# Patient Record
Sex: FEMALE | Race: WHITE | NOT HISPANIC OR LATINO | Employment: OTHER | URBAN - METROPOLITAN AREA
[De-identification: names, ages, dates, MRNs, and addresses within clinical notes are randomized per-mention and may not be internally consistent; named-entity substitution may affect disease eponyms.]

---

## 2017-02-24 DIAGNOSIS — O26.90 PREGNANCY RELATED CONDITION, UNSPECIFIED TRIMESTER: Primary | ICD-10-CM

## 2017-03-01 ENCOUNTER — TRANSFERRED RECORDS (OUTPATIENT)
Dept: HEALTH INFORMATION MANAGEMENT | Facility: CLINIC | Age: 47
End: 2017-03-01

## 2017-03-01 LAB — PAP SMEAR - HIM PATIENT REPORTED: NEGATIVE

## 2017-03-20 ENCOUNTER — ANESTHESIA (OUTPATIENT)
Dept: OBGYN | Facility: CLINIC | Age: 47
End: 2017-03-20
Payer: COMMERCIAL

## 2017-03-20 ENCOUNTER — ANESTHESIA EVENT (OUTPATIENT)
Dept: OBGYN | Facility: CLINIC | Age: 47
End: 2017-03-20
Payer: COMMERCIAL

## 2017-03-20 PROBLEM — Z98.891 S/P CESAREAN SECTION: Status: ACTIVE | Noted: 2017-03-20

## 2017-03-20 PROCEDURE — 25800025 ZZH RX 258: Performed by: OBSTETRICS & GYNECOLOGY

## 2017-03-20 PROCEDURE — 25000128 H RX IP 250 OP 636: Performed by: NURSE ANESTHETIST, CERTIFIED REGISTERED

## 2017-03-20 PROCEDURE — 25000128 H RX IP 250 OP 636: Performed by: ANESTHESIOLOGY

## 2017-03-20 PROCEDURE — 25000128 H RX IP 250 OP 636: Performed by: OBSTETRICS & GYNECOLOGY

## 2017-03-20 PROCEDURE — 25000125 ZZHC RX 250: Performed by: NURSE ANESTHETIST, CERTIFIED REGISTERED

## 2017-03-20 RX ORDER — MORPHINE SULFATE 1 MG/ML
INJECTION, SOLUTION EPIDURAL; INTRATHECAL; INTRAVENOUS PRN
Status: DISCONTINUED | OUTPATIENT
Start: 2017-03-20 | End: 2017-03-20

## 2017-03-20 RX ORDER — FENTANYL CITRATE 50 UG/ML
INJECTION, SOLUTION INTRAMUSCULAR; INTRAVENOUS PRN
Status: DISCONTINUED | OUTPATIENT
Start: 2017-03-20 | End: 2017-03-20

## 2017-03-20 RX ORDER — BUPIVACAINE HYDROCHLORIDE 7.5 MG/ML
INJECTION, SOLUTION INTRASPINAL PRN
Status: DISCONTINUED | OUTPATIENT
Start: 2017-03-20 | End: 2017-03-20

## 2017-03-20 RX ORDER — EPHEDRINE SULFATE 50 MG/ML
INJECTION, SOLUTION INTRAMUSCULAR; INTRAVENOUS; SUBCUTANEOUS PRN
Status: DISCONTINUED | OUTPATIENT
Start: 2017-03-20 | End: 2017-03-20

## 2017-03-20 RX ADMIN — SODIUM CHLORIDE, POTASSIUM CHLORIDE, SODIUM LACTATE AND CALCIUM CHLORIDE: 600; 310; 30; 20 INJECTION, SOLUTION INTRAVENOUS at 07:39

## 2017-03-20 RX ADMIN — CEFAZOLIN SODIUM 2 G: 2 INJECTION, SOLUTION INTRAVENOUS at 07:16

## 2017-03-20 RX ADMIN — BUPIVACAINE HYDROCHLORIDE IN DEXTROSE 11.25 MG: 7.5 INJECTION, SOLUTION SUBARACHNOID at 07:07

## 2017-03-20 RX ADMIN — ONDANSETRON 4 MG: 2 INJECTION INTRAMUSCULAR; INTRAVENOUS at 07:04

## 2017-03-20 RX ADMIN — FENTANYL CITRATE 10 MCG: 50 INJECTION, SOLUTION INTRAMUSCULAR; INTRAVENOUS at 07:07

## 2017-03-20 RX ADMIN — PHENYLEPHRINE HYDROCHLORIDE 100 MCG: 10 INJECTION, SOLUTION INTRAMUSCULAR; INTRAVENOUS; SUBCUTANEOUS at 07:57

## 2017-03-20 RX ADMIN — PHENYLEPHRINE HYDROCHLORIDE 100 MCG: 10 INJECTION, SOLUTION INTRAMUSCULAR; INTRAVENOUS; SUBCUTANEOUS at 07:14

## 2017-03-20 RX ADMIN — PHENYLEPHRINE HYDROCHLORIDE 100 MCG: 10 INJECTION, SOLUTION INTRAMUSCULAR; INTRAVENOUS; SUBCUTANEOUS at 07:42

## 2017-03-20 RX ADMIN — Medication 5 MG: at 07:30

## 2017-03-20 RX ADMIN — MORPHINE SULFATE 0.1 MG: 1 INJECTION EPIDURAL; INTRATHECAL; INTRAVENOUS at 07:07

## 2017-03-20 RX ADMIN — Medication 5 MG: at 07:46

## 2017-03-20 RX ADMIN — Medication 5 MG: at 07:14

## 2017-03-20 RX ADMIN — Medication: at 07:35

## 2017-03-20 RX ADMIN — PHENYLEPHRINE HYDROCHLORIDE 100 MCG: 10 INJECTION, SOLUTION INTRAMUSCULAR; INTRAVENOUS; SUBCUTANEOUS at 07:10

## 2017-03-20 RX ADMIN — PHENYLEPHRINE HYDROCHLORIDE 100 MCG: 10 INJECTION, SOLUTION INTRAMUSCULAR; INTRAVENOUS; SUBCUTANEOUS at 07:30

## 2017-03-20 RX ADMIN — Medication 5 MG: at 07:10

## 2017-03-20 RX ADMIN — PHENYLEPHRINE HYDROCHLORIDE 100 MCG: 10 INJECTION, SOLUTION INTRAMUSCULAR; INTRAVENOUS; SUBCUTANEOUS at 07:46

## 2017-03-20 ASSESSMENT — LIFESTYLE VARIABLES: TOBACCO_USE: 0

## 2017-03-20 NOTE — ADDENDUM NOTE
Addendum  created 03/20/17 3366 by Arpit Palmer MD    Anesthesia Review and Sign - Ready for Procedure, Anesthesia Review and Sign - Signed

## 2017-03-20 NOTE — ANESTHESIA POSTPROCEDURE EVALUATION
Patient: Ethel De Luna    Procedure(s):  REPEAT  SECTION - Wound Class: I-Clean    Diagnosis:PREVIOUS AND FIBROIDS  Diagnosis Additional Information: No value filed.    Anesthesia Type:  Spinal    Note:  Anesthesia Post Evaluation    Patient location during evaluation: PACU  Patient participation: Able to fully participate in evaluation  Level of consciousness: awake and alert  Pain management: adequate  Airway patency: patent  Cardiovascular status: acceptable  Respiratory status: acceptable  Hydration status: acceptable  PONV: none     Anesthetic complications: None          Last vitals:  Vitals:    17 1046 17 1132 17 1228   BP: 126/67 123/72 126/57   Resp: 16 16 16   Temp:      SpO2: 100% 98% 99%         Electronically Signed By: Arpit Palmer MD  2017  1:43 PM

## 2017-03-20 NOTE — ANESTHESIA PREPROCEDURE EVALUATION
Procedure: Procedure(s):   SECTION  Preop diagnosis: PREVIOUS AND FIBROIDS    No Known Allergies  Past Medical History   Diagnosis Date     Fibroid of cervix      Infertility      IVF     Past Surgical History   Procedure Laterality Date     Orthopedic surgery       right hip surgery for hip dysplasia when she was 5yrs old      section N/A 2015     Procedure:  SECTION;  Surgeon: Merry Ying MD;  Location:  L+D     Prior to Admission medications    Medication Sig Start Date End Date Taking? Authorizing Provider   Prenatal Vit w/Ok-Cipdenwyd-QI (PNV PO)    Yes Reported, Patient   Ranitidine HCl (ZANTAC 150 MAXIMUM STRENGTH PO) Take by mouth 2 times daily   Yes Reported, Patient   ferrous sulfate (SLO-FE) 142 (45 FE) MG TBCR Take 142 mg by mouth daily   Yes Reported, Patient   Acetaminophen (TYLENOL PO) Take 650 mg by mouth as needed for mild pain or fever   Yes Reported, Patient   NO ACTIVE MEDICATIONS     Reported, Patient     Current Facility-Administered Medications Ordered in Epic   Medication Dose Route Frequency Last Rate Last Dose     lactated ringers infusion   Intravenous Continuous 125 mL/hr at 17 0556       sodium citrate-citric acid (BICITRA) solution 30 mL  30 mL Oral Pre-Op/Pre-procedure x 1 dose         ceFAZolin sodium-dextrose (ANCEF) infusion 2 g  2 g Intravenous Pre-Op/Pre-procedure x 1 dose         ceFAZolin (ANCEF) 1 g vial to attach to  ml bag for ADULT or 50 ml bag for PEDS  1 g Intravenous Q2H PRN         No current [facility]-ordered outpatient prescriptions on file.     Wt Readings from Last 1 Encounters:   17 80.7 kg (178 lb)     Temp Readings from Last 1 Encounters:   16 37  C (98.6  F) (Oral)     BP Readings from Last 6 Encounters:   16 117/73   16 114/56   07/05/15 130/81   11 110/70     Pulse Readings from Last 4 Encounters:   16 83   16 59   07/04/15 70   11 80     Resp Readings from Last  1 Encounters:   05/16/16 20     SpO2 Readings from Last 1 Encounters:   07/03/15 98%     Recent Labs   Lab Test  05/02/16   1050   CR  0.60     Recent Labs   Lab Test  03/20/17   0535  05/16/16   1505  05/02/16   1050   WBC   --   7.8  6.2   HGB  10.6*  11.9  11.2*   PLT   --   303  263     No results for input(s): INR in the last 82135 hours.    Invalid input(s): APTT   RECENT LABS:   ECG:   ECHO:   CXR:    Anesthesia Evaluation     .        ROS/MED HX    ENT/Pulmonary:      (-) tobacco use and sleep apnea   Neurologic:       Cardiovascular:         METS/Exercise Tolerance:     Hematologic:         Musculoskeletal:         GI/Hepatic:     (+) GERD       Renal/Genitourinary: Comment: Uterine fibroids        Endo:         Psychiatric:         Infectious Disease:         Malignancy:         Other:               Physical Exam  Normal systems: cardiovascular, pulmonary and dental    Airway   Mallampati: III  TM distance: >3 FB  Neck ROM: full    Dental     Cardiovascular       Pulmonary                     Anesthesia Plan      History & Physical Review  History and physical reviewed and following examination; no interval change.    ASA Status:  2 .        Plan for Spinal   PONV prophylaxis:  Ondansetron (or other 5HT-3)       Postoperative Care  Postoperative pain management:  Neuraxial analgesia.      Consents  Anesthetic plan, risks, benefits and alternatives discussed with:  Patient and Spouse..                          .

## 2017-03-20 NOTE — ANESTHESIA CARE TRANSFER NOTE
Patient: Ethel De Luna    Procedure(s):  REPEAT  SECTION - Wound Class: I-Clean    Diagnosis: PREVIOUS AND FIBROIDS  Diagnosis Additional Information: No value filed.    Anesthesia Type:   Spinal     Note:  Airway :Room Air  Patient transferred to:PACU        Vitals: (Last set prior to Anesthesia Care Transfer)    CRNA VITALS  3/20/2017 0750 - 3/20/2017 0825      3/20/2017             Resp Rate (set): 10                Electronically Signed By: AXEL Spangler CRNA  2017  8:25 AM

## 2017-04-14 ENCOUNTER — TELEPHONE (OUTPATIENT)
Dept: NURSING | Facility: CLINIC | Age: 47
End: 2017-04-14

## 2017-04-14 NOTE — TELEPHONE ENCOUNTER
Call Type: Triage Call    Presenting Problem: Patient calling with questions about cold relief  medications that are safe to take while breast feeding.  Triage Note:  Guideline Title: Information Only Call; No Symptom Triage (Adult)  Recommended Disposition: Call Provider When Office is Open  Original Inclination: Wanted to speak with a nurse  Override Disposition:  Intended Action: Call PCP/HCP  Physician Contacted: No  Requesting information and provider is best resource; no triage required. ?  YES  Requesting regular office appointment ? NO  Sign(s) or symptom(s) associated with a diagnosed condition or with a new illness  ? NO  Requesting information about provider, services or community resources ? NO  Call back to complete assessment/clarification of information from prior caller to  complete triage ? NO  Physician Instructions:  Care Advice:

## 2017-05-14 ENCOUNTER — HOSPITAL ENCOUNTER (EMERGENCY)
Facility: CLINIC | Age: 47
Discharge: HOME | End: 2017-05-14
Attending: PHYSICIAN ASSISTANT | Admitting: PHYSICIAN ASSISTANT
Payer: COMMERCIAL

## 2017-05-14 VITALS
RESPIRATION RATE: 18 BRPM | DIASTOLIC BLOOD PRESSURE: 78 MMHG | OXYGEN SATURATION: 99 % | WEIGHT: 144 LBS | BODY MASS INDEX: 24.59 KG/M2 | HEIGHT: 64 IN | TEMPERATURE: 98.3 F | SYSTOLIC BLOOD PRESSURE: 141 MMHG

## 2017-05-14 DIAGNOSIS — J06.9 UPPER RESPIRATORY TRACT INFECTION, UNSPECIFIED TYPE: ICD-10-CM

## 2017-05-14 PROCEDURE — 99282 EMERGENCY DEPT VISIT SF MDM: CPT

## 2017-05-14 RX ORDER — AZITHROMYCIN 250 MG/1
TABLET, FILM COATED ORAL
Qty: 6 TABLET | Refills: 0 | Status: SHIPPED | OUTPATIENT
Start: 2017-05-14 | End: 2018-01-25

## 2017-05-14 ASSESSMENT — ENCOUNTER SYMPTOMS
SORE THROAT: 1
NAUSEA: 0
VOMITING: 0
SHORTNESS OF BREATH: 0
COUGH: 1
FEVER: 0

## 2017-05-14 NOTE — ED AVS SNAPSHOT
Emergency Department    [address] [facility] [address]    Phone:  [phone]    Fax:  [phone]                                       Ethel De Luna   MRN: [id number]    Department:   Emergency Department   Date of Visit:  5/14/2017           Patient Information     Date Of Birth          1970        Your diagnoses for this visit were:     Upper respiratory tract infection, unspecified type        You were seen by Jeanna Malik PA-C.      Follow-up Information     Follow up with Merry Ying MD. Schedule an appointment as soon as possible for a visit in 5 days.    Specialty:  Obstetrics    Why:  for a recheck     Contact information:    [facility]  [address] [place] [address] [address]  [phone]          Discharge Instructions       Discharge Instructions  Upper Respiratory Infection    The upper respiratory tract includes the sinuses, nasal passages, pharynx, and larynx. A URI, or upper respiratory infection, is an infection of any of the parts of the upper airway. Symptoms include runny nose, congestion, sore throat, cough, and fever. URIs are almost always caused by a virus. Antibiotics do not help with virus infections, so are not used for an ordinary URI. A URI is very contagious through coughing and nasal secretions; make sure you wash your hands often and clean surfaces after sneezing, coughing or touching them.  Viruses can live on surfaces for up to 3 days.      Return to the Emergency Department if:    Any of the symptoms you have get much worse.    You seem very sick, like being too weak to get up.    You have any new symptoms, especially serious things like chest pain.     You are short of breath.     You have a severe headache.    You are vomiting so much you can t keep fluids or medicines down.    You have confusion or seem unusually drowsy.    You have a seizure or convulsion.    Follow-up:      You should start to improve  in 3 - 5 days.  A cough can linger for up to six weeks, but overall you should be feeling much better.  See your doctor if you have a fever for more than 3 days, or if you are not feeling better within 5 days.      What can I do to help myself?    Fill any prescriptions the doctor gave you and take them right away    If you have a fever, get plenty of rest and drink lots of fluids, especially water. Using a humidifier or saline nose spray will also help loosen secretions.     What clothes or blankets you have on won t change your fever. Do what is comfortable for you.    Bathing or sponging in lukewarm water may help you feel better.    Tylenol  (acetaminophen), Motrin  (ibuprofen), or Advil  (ibuprofen) help bring fever down and may help you feel more comfortable. Be sure to read and follow the package directions, and ask your doctor if you have questions.    Do not drink alcohol.    Decongestants may help you feel better. You may use decongestant nose sprays Afrin  (oxymetazoline) or Carl-Synephrine  (phenylephrine hydrochloride) for up to 3 days, or may use a decongestant tablet like Sudafed  (pseudoephedrine).  If you were given a prescription for medicine here today, be sure to read all of the information (including the package insert) that comes with your prescription.  This will include important information about the medicine, its side effects, and any warnings that you need to know about.  The pharmacist who fills the prescription can provide more information and answer questions you may have about the medicine.  If you have questions or concerns that the pharmacist cannot address, please call or return to the Emergency Department.   Opioid Medication Information    Pain medications are among the most commonly prescribed medicines, so we are including this information for all our patients. If you did not receive pain medication or get a prescription for pain medicine, you can ignore it.     You may have been  given a prescription for an opioid (narcotic) pain medicine and/or have received a pain medicine while here in the Emergency Department. These medicines can make you drowsy or impaired. You must not drive, operate dangerous equipment, or engage in any other dangerous activities while taking these medications. If you drive while taking these medications, you could be arrested for DUI, or driving under the influence. Do not drink any alcohol while you are taking these medications.     Opioid pain medications can cause addiction. If you have a history of chemical dependency of any type, you are at a higher risk of becoming addicted to pain medications.  Only take these prescribed medications to treat your pain when all other options have been tried. Take it for as short a time and as few doses as possible. Store your pain pills in a secure place, as they are frequently stolen and provide a dangerous opportunity for children or visitors in your house to start abusing these powerful medications. We will not replace any lost or stolen medicine.  As soon as your pain is better, you should flush all your remaining medication.     Many prescription pain medications contain Tylenol  (acetaminophen), including Vicodin , Tylenol #3 , Norco , Lortab , and Percocet .  You should not take any extra pills of Tylenol  if you are using these prescription medications or you can get very sick.  Do not ever take more than 3000 mg of acetaminophen in any 24 hour period.    All opioids tend to cause constipation. Drink plenty of water and eat foods that have a lot of fiber, such as fruits, vegetables, prune juice, apple juice and high fiber cereal.  Take a laxative if you don t move your bowels at least every other day. Miralax , Milk of Magnesia, Colace , or Senna  can be used to keep you regular.      Remember that you can always come back to the Emergency Department if you are not able to see your regular doctor in the amount of time  listed above, if you get any new symptoms, or if there is anything that worries you.        24 Hour Appointment Hotline       To make an appointment at any [facility], call [phone] ([phone]). If you don't have a family doctor or clinic, we will help you find one. [facility] clinics are conveniently located to serve the needs of you and your family.             Review of your medicines      START taking        Dose / Directions Last dose taken    azithromycin 250 MG tablet   Commonly known as:  ZITHROMAX Z-APOLONIA   Quantity:  6 tablet        Two tablets on the first day, then one tablet daily for the next 4 days   Refills:  0          Our records show that you are taking the medicines listed below. If these are incorrect, please call your family doctor or clinic.        Dose / Directions Last dose taken    docusate sodium 100 MG capsule   Commonly known as:  COLACE   Dose:  100 mg   Quantity:  60 capsule        Take 1 capsule (100 mg) by mouth 2 times daily   Refills:  1        ferrous sulfate 142 (45 FE) MG Tbcr   Commonly known as:  SLO-FE   Dose:  142 mg        Take 142 mg by mouth daily   Refills:  0        ibuprofen 400 MG tablet   Commonly known as:  ADVIL/MOTRIN   Dose:  400-800 mg   Quantity:  90 tablet        Take 1-2 tablets (400-800 mg) by mouth every 6 hours as needed for other (cramping)   Refills:  1        oxyCODONE 5 MG IR tablet   Commonly known as:  ROXICODONE   Dose:  5-10 mg   Quantity:  45 tablet        Take 1-2 tablets (5-10 mg) by mouth every 3 hours as needed for moderate to severe pain   Refills:  0        PNV PO        Refills:  0        TYLENOL PO   Dose:  650 mg        Take 650 mg by mouth as needed for mild pain or fever   Refills:  0                Prescriptions were sent or printed at these locations (1 Prescription)                   Other Prescriptions                Printed at Department/Unit printer (1 of 1)         azithromycin (ZITHROMAX Z-APOLONIA) 250 MG tablet                 Orders Needing Specimen Collection     None      Pending Results     No orders found from 5/12/2017 to 5/15/2017.            Pending Culture Results     No orders found from 5/12/2017 to 5/15/2017.            Pending Results Instructions     If you had any lab results that were not finalized at the time of your Discharge, you can call the ED Lab Result RN at [phone]. You will be contacted by this team for any positive Lab results or changes in treatment. The nurses are available 7 days a week from 10A to 6:30P.  You can leave a message 24 hours per day and they will return your call.        Test Results From Your Hospital Stay               Clinical Quality Measure: Blood Pressure Screening     Your blood pressure was checked while you were in the emergency department today. The last reading we obtained was  BP: 141/78 . Please read the guidelines below about what these numbers mean and what you should do about them.  If your systolic blood pressure (the top number) is less than 120 and your diastolic blood pressure (the bottom number) is less than 80, then your blood pressure is normal. There is nothing more that you need to do about it.  If your systolic blood pressure (the top number) is 120-139 or your diastolic blood pressure (the bottom number) is 80-89, your blood pressure may be higher than it should be. You should have your blood pressure rechecked within a year by a primary care provider.  If your systolic blood pressure (the top number) is 140 or greater or your diastolic blood pressure (the bottom number) is 90 or greater, you may have high blood pressure. High blood pressure is treatable, but if left untreated over time it can put you at risk for heart attack, stroke, or kidney failure. You should have your blood pressure rechecked by a primary care provider within the next 4 weeks.  If your provider in the emergency department today gave you specific instructions to follow-up with your  "doctor or provider even sooner than that, you should follow that instruction and not wait for up to 4 weeks for your follow-up visit.        Thank you for choosing [facility]       Thank you for choosing [facility] for your care. Our goal is always to provide you with excellent care. Hearing back from our patients is one way we can continue to improve our services. Please take a few minutes to complete the written survey that you may receive in the mail after you visit with us. Thank you!        [email or web address] Information     [facility] lets you send messages to your doctor, view your test results, renew your prescriptions, schedule appointments and more. To sign up, go to [email or web address] . Click on \"Log in\" on the left side of the screen, which will take you to the Welcome page. Then click on \"Sign up Now\" on the right side of the page.     You will be asked to enter the access code listed below, as well as some personal information. Please follow the directions to create your username and password.     Your access code is: [email or web address]  Expires: 2017 11:35 AM     Your access code will  in 90 days. If you need help or a new code, please call your [facility] clinic or [phone].        Care EveryWhere ID     This is your Care EveryWhere ID. This could be used by other organizations to access your [facility] medical records  [id number]        After Visit Summary       This is your record. Keep this with you and show to your community pharmacist(s) and doctor(s) at your next visit.                  "

## 2017-05-14 NOTE — ED AVS SNAPSHOT
Emergency Department    [address] [place] [address]    Phone:  [phone]    Fax:  [phone]                                       Ethel De Luna   MRN: [id number]    Department:   Emergency Department   Date of Visit:  5/14/2017           After Visit Summary Signature Page     I have received my discharge instructions, and my questions have been answered. I have discussed any challenges I see with this plan with the nurse or doctor.    ..........................................................................................................................................  Patient/Patient Representative Signature      ..........................................................................................................................................  Patient Representative Print Name and Relationship to Patient    ..................................................               ................................................  Date                                            Time    ..........................................................................................................................................  Reviewed by Signature/Title    ...................................................              ..............................................  Date                                                            Time

## 2017-05-15 ENCOUNTER — CARE COORDINATION (OUTPATIENT)
Dept: CASE MANAGEMENT | Facility: CLINIC | Age: 47
End: 2017-05-15

## 2017-05-15 NOTE — ED PROVIDER NOTES
"  History     Chief Complaint:  Cold symptoms    HPI   Ethel De Luna is a 47 year old female who presents to the ED for evaluation of cold symptoms. The patient reports four day history of cough, congestion, sore throat, which has progressively worsened. She describes feeling a \"heaviness\", but states she is not short of breath. Her cough is productive but non bloody.  She has not measured a fever at home. She denies nausea and vomiting. She has no weird rashes or measles exposure.    Allergies:  No known drug allergies    Medications:    The patient is not currently taking any prescribed medications.    Past Medical History:    Fibroid of cervix  Infertility  Anemia    Past Surgical History:    C section  Orthopedic surgery    Family History:    No past pertinent family history.    Social History:  The patient was in the ED alone.  Smoking Status: Former smoker- 0.5 packs/day for 20 yrs  Alcohol Use: No  Marital Status:        Review of Systems   Constitutional: Negative for fever.   HENT: Positive for congestion and sore throat.    Respiratory: Positive for cough. Negative for shortness of breath.    Gastrointestinal: Negative for nausea and vomiting.   All other systems reviewed and are negative.    Physical Exam   First Vitals:  BP: 141/78  Heart Rate: 70  Temp: 98.3  F (36.8  C)  Resp: 18  Height: 162.6 cm (5' 4\")  Weight: 65.3 kg (144 lb)  SpO2: 99 %      Physical Exam  General: Alert, interactive, appears comfortable.    Eye:  Pupils are equal, round, and reactive.      ENT:   Bilateral TM's are non erythematous. Positive light reflex. Right has mild cloudy effusion.      No rhinorrhea.     Moist mucus membranes.  Oropharynx is clear with no exudates.     Uvula midline. No trismus.     Neck: No rigidity. Trachea is  Midline. No palpable mass is detected              Cardiac:  Regular rate and rhythm. S1, S2.  No murmurs, gallops, or rubs.                   Pulmonary:  Clear to auscultation " bilaterally.  No wheezes or crackles.             Non labored. No use of accessory muscles.     Abdomen:  Abdomen is soft and non-distended, without focal tenderness.     Musculoskeletal:  Freely moveable extremities    Skin:  Warm and dry without rashes.    Neurologic:  Non-focal exam without asymmetric weakness or numbness.  GCS 15    Psychiatric:  Awake. Normal affect with appropriate interaction with examiner.    Emergency Department Course     Emergency Department Course:  Nursing notes and vitals reviewed.  I performed an exam of the patient as documented above.     Findings and plan explained to the patient. Patient discharged home with instructions regarding supportive care, medications, and reasons to return. The importance of close follow-up was reviewed.  The patient was prescribed Azithromycin.      Impression & Plan    Medical Decision Making:  Ethel De Luna is a 47 year old female who presents for evaluation of congestion, cough and sore throat.  This is consistent with an upper respiratory tract infection.  There is no signs at this point of serious bacterial infection such as OM, RPA, epiglottitis, PTA, strep pharyngitis, pneumonia, sinusitis, meningitis, bacteremia, serious bacterial infection.  Given clear lungs, fever curve, no hypoxia and no respiratory distress I do not feel she needs a CXR at this point as the probability of bacterial pneumonia is very unlikely.   There are no gastrointestinal symptoms at this point and no signs of dehydration.  I suspect this is viral etiology and recommend continued symptomatic treatment. The pt is requesting a zpack as she has had this previously. I did write her a prescription for this but recommend she wait 3-4 more days to see if she develops any improvement. I did inform her that azithromycin is excreted in the breast milk, as she is nursing, however, per up to date it is liekly safe. However, I informed the baby could have side effects such as  vomiting, diarrhea, rash, lethargy. Close followup with primary care physician is indicated for a recheck in 5 days if symptoms persist.  Return to ED for fever, protracted vomiting, confusion. The patient requested a prescription for antibiotics.    Diagnosis:  (J06.9) Upper respiratory tract infection, unspecified type    Disposition:  The patient was discharged home.    Discharge medications:  Discharge Medication List as of 5/14/2017  9:05 PM      START taking these medications    Details   azithromycin (ZITHROMAX Z-APOLONIA) 250 MG tablet Two tablets on the first day, then one tablet daily for the next 4 days, Disp-6 tablet, R-0, Local Print               5/14/2017    EMERGENCY DEPARTMENT      I, Rekha Amin, am serving as a scribe at 2045 on May 14, 2017 to document services personally performed by  Jeanna MCCANN, based on my observations and the provider's statements to me.       Jeanna Malik PA-C  05/14/17 2353

## 2017-05-15 NOTE — DISCHARGE INSTRUCTIONS
Discharge Instructions  Upper Respiratory Infection    The upper respiratory tract includes the sinuses, nasal passages, pharynx, and larynx. A URI, or upper respiratory infection, is an infection of any of the parts of the upper airway. Symptoms include runny nose, congestion, sore throat, cough, and fever. URIs are almost always caused by a virus. Antibiotics do not help with virus infections, so are not used for an ordinary URI. A URI is very contagious through coughing and nasal secretions; make sure you wash your hands often and clean surfaces after sneezing, coughing or touching them.  Viruses can live on surfaces for up to 3 days.      Return to the Emergency Department if:    Any of the symptoms you have get much worse.    You seem very sick, like being too weak to get up.    You have any new symptoms, especially serious things like chest pain.     You are short of breath.     You have a severe headache.    You are vomiting so much you can t keep fluids or medicines down.    You have confusion or seem unusually drowsy.    You have a seizure or convulsion.    Follow-up:      You should start to improve in 3 - 5 days.  A cough can linger for up to six weeks, but overall you should be feeling much better.  See your doctor if you have a fever for more than 3 days, or if you are not feeling better within 5 days.      What can I do to help myself?    Fill any prescriptions the doctor gave you and take them right away    If you have a fever, get plenty of rest and drink lots of fluids, especially water. Using a humidifier or saline nose spray will also help loosen secretions.     What clothes or blankets you have on won t change your fever. Do what is comfortable for you.    Bathing or sponging in lukewarm water may help you feel better.    Tylenol  (acetaminophen), Motrin  (ibuprofen), or Advil  (ibuprofen) help bring fever down and may help you feel more comfortable. Be sure to read and follow the package  directions, and ask your doctor if you have questions.    Do not drink alcohol.    Decongestants may help you feel better. You may use decongestant nose sprays Afrin  (oxymetazoline) or Carl-Synephrine  (phenylephrine hydrochloride) for up to 3 days, or may use a decongestant tablet like Sudafed  (pseudoephedrine).  If you were given a prescription for medicine here today, be sure to read all of the information (including the package insert) that comes with your prescription.  This will include important information about the medicine, its side effects, and any warnings that you need to know about.  The pharmacist who fills the prescription can provide more information and answer questions you may have about the medicine.  If you have questions or concerns that the pharmacist cannot address, please call or return to the Emergency Department.   Opioid Medication Information    Pain medications are among the most commonly prescribed medicines, so we are including this information for all our patients. If you did not receive pain medication or get a prescription for pain medicine, you can ignore it.     You may have been given a prescription for an opioid (narcotic) pain medicine and/or have received a pain medicine while here in the Emergency Department. These medicines can make you drowsy or impaired. You must not drive, operate dangerous equipment, or engage in any other dangerous activities while taking these medications. If you drive while taking these medications, you could be arrested for DUI, or driving under the influence. Do not drink any alcohol while you are taking these medications.     Opioid pain medications can cause addiction. If you have a history of chemical dependency of any type, you are at a higher risk of becoming addicted to pain medications.  Only take these prescribed medications to treat your pain when all other options have been tried. Take it for as short a time and as few doses as possible.  Store your pain pills in a secure place, as they are frequently stolen and provide a dangerous opportunity for children or visitors in your house to start abusing these powerful medications. We will not replace any lost or stolen medicine.  As soon as your pain is better, you should flush all your remaining medication.     Many prescription pain medications contain Tylenol  (acetaminophen), including Vicodin , Tylenol #3 , Norco , Lortab , and Percocet .  You should not take any extra pills of Tylenol  if you are using these prescription medications or you can get very sick.  Do not ever take more than 3000 mg of acetaminophen in any 24 hour period.    All opioids tend to cause constipation. Drink plenty of water and eat foods that have a lot of fiber, such as fruits, vegetables, prune juice, apple juice and high fiber cereal.  Take a laxative if you don t move your bowels at least every other day. Miralax , Milk of Magnesia, Colace , or Senna  can be used to keep you regular.      Remember that you can always come back to the Emergency Department if you are not able to see your regular doctor in the amount of time listed above, if you get any new symptoms, or if there is anything that worries you.

## 2017-09-15 ENCOUNTER — OFFICE VISIT (OUTPATIENT)
Dept: URGENT CARE | Facility: URGENT CARE | Age: 47
End: 2017-09-15
Payer: COMMERCIAL

## 2017-09-15 VITALS
DIASTOLIC BLOOD PRESSURE: 66 MMHG | OXYGEN SATURATION: 96 % | TEMPERATURE: 98.8 F | HEART RATE: 65 BPM | SYSTOLIC BLOOD PRESSURE: 113 MMHG

## 2017-09-15 DIAGNOSIS — R30.0 DYSURIA: Primary | ICD-10-CM

## 2017-09-15 LAB
ALBUMIN UR-MCNC: 100 MG/DL
APPEARANCE UR: ABNORMAL
BACTERIA #/AREA URNS HPF: ABNORMAL /HPF
BILIRUB UR QL STRIP: NEGATIVE
COLOR UR AUTO: ABNORMAL
GLUCOSE UR STRIP-MCNC: NEGATIVE MG/DL
HGB UR QL STRIP: ABNORMAL
KETONES UR STRIP-MCNC: NEGATIVE MG/DL
LEUKOCYTE ESTERASE UR QL STRIP: ABNORMAL
NITRATE UR QL: NEGATIVE
NON-SQ EPI CELLS #/AREA URNS LPF: ABNORMAL /LPF
PH UR STRIP: 5.5 PH (ref 5–7)
RBC #/AREA URNS AUTO: >100 /HPF
SOURCE: ABNORMAL
SP GR UR STRIP: 1.02 (ref 1–1.03)
UROBILINOGEN UR STRIP-ACNC: 0.2 EU/DL (ref 0.2–1)
WBC #/AREA URNS AUTO: ABNORMAL /HPF

## 2017-09-15 PROCEDURE — 81001 URINALYSIS AUTO W/SCOPE: CPT | Performed by: HOSPITALIST

## 2017-09-15 PROCEDURE — 87088 URINE BACTERIA CULTURE: CPT | Performed by: HOSPITALIST

## 2017-09-15 PROCEDURE — 99213 OFFICE O/P EST LOW 20 MIN: CPT

## 2017-09-15 PROCEDURE — 87086 URINE CULTURE/COLONY COUNT: CPT | Performed by: HOSPITALIST

## 2017-09-15 PROCEDURE — 87186 SC STD MICRODIL/AGAR DIL: CPT | Performed by: HOSPITALIST

## 2017-09-15 RX ORDER — SULFAMETHOXAZOLE/TRIMETHOPRIM 800-160 MG
1 TABLET ORAL 2 TIMES DAILY
Qty: 10 TABLET | Refills: 0 | Status: SHIPPED | OUTPATIENT
Start: 2017-09-15 | End: 2017-09-20

## 2017-09-15 NOTE — MR AVS SNAPSHOT
"              After Visit Summary   9/15/2017    Ethel De Luna    MRN: [id number]           Patient Information     Date Of Birth          1970        Visit Information        Provider Department      9/15/2017 7:45 PM [facility] URGENT CARE [facility]        Today's Diagnoses     Dysuria    -  1       Follow-ups after your visit        Who to contact     If you have questions or need follow up information about today's clinic visit or your schedule please contact [facility] directly at [phone].  Normal or non-critical lab and imaging results will be communicated to you by Pixel Velocityhart, letter or phone within 4 business days after the clinic has received the results. If you do not hear from us within 7 days, please contact the clinic through Fanshoutt or phone. If you have a critical or abnormal lab result, we will notify you by phone as soon as possible.  Submit refill requests through Helpr or call your pharmacy and they will forward the refill request to us. Please allow 3 business days for your refill to be completed.          Additional Information About Your Visit        MyChart Information     Helpr lets you send messages to your doctor, view your test results, renew your prescriptions, schedule appointments and more. To sign up, go to [email or web address] . Click on \"Log in\" on the left side of the screen, which will take you to the Welcome page. Then click on \"Sign up Now\" on the right side of the page.     You will be asked to enter the access code listed below, as well as some personal information. Please follow the directions to create your username and password.     Your access code is: [email or web address]  Expires: 2017 10:40 AM     Your access code will  in 90 days. If you need help or a new code, please call your [facility] or [phone].        Care EveryWhere ID     This is your Care EveryWhere ID. This could be used by other " organizations to access your [facility] medical records  [id number]        Your Vitals Were     Pulse Temperature Pulse Oximetry Breastfeeding?          65 98.8  F (37.1  C) (Oral) 96% Yes         Blood Pressure from Last 3 Encounters:   09/15/17 113/66   05/14/17 141/78   03/23/17 114/65    Weight from Last 3 Encounters:   05/14/17 144 lb (65.3 kg)   03/20/17 178 lb (80.7 kg)   07/02/15 181 lb (82.1 kg)              We Performed the Following     *UA reflex to Microscopic and Culture ([place] and [facility] (except [place] and [place])     Urine Culture Aerobic Bacterial     Urine Microscopic          Today's Medication Changes          These changes are accurate as of: 9/15/17  8:17 PM.  If you have any questions, ask your nurse or doctor.               Start taking these medicines.        Dose/Directions    sulfamethoxazole-trimethoprim 800-160 MG per tablet   Commonly known as:  BACTRIM DS/SEPTRA DS   Used for:  Dysuria        Dose:  1 tablet   Take 1 tablet by mouth 2 times daily for 5 days   Quantity:  10 tablet   Refills:  0            Where to get your medicines      Some of these will need a paper prescription and others can be bought over the counter.  Ask your nurse if you have questions.     Bring a paper prescription for each of these medications     sulfamethoxazole-trimethoprim 800-160 MG per tablet                Primary Care Provider Office Phone # Fax #    Merry BOYER MD [place] [phone]       [facility] [address]  [place] [address]        Equal Access to Services     HELEN HAYWOOD AH: Hadii aad ku hadasho Solexiali, waaxda luqadaha, qaybta kaalmada adeegyada, waxay paty oneal. So [facility] [phone].    ATENCIÓN: Si habla español, tiene a anna disposición servicios gratuitos de asistencia lingüística. Llame al [phone].    We comply with applicable federal civil rights laws and [place] laws. We do not discriminate on the  basis of race, color, national origin, age, disability sex, sexual orientation or gender identity.            Thank you!     Thank you for choosing [facility] URGENT CARE  for your care. Our goal is always to provide you with excellent care. Hearing back from our patients is one way we can continue to improve our services. Please take a few minutes to complete the written survey that you may receive in the mail after your visit with us. Thank you!             Your Updated Medication List - Protect others around you: Learn how to safely use, store and throw away your medicines at www.disposemymeds.org.          This list is accurate as of: 9/15/17  8:17 PM.  Always use your most recent med list.                   Brand Name Dispense Instructions for use Diagnosis    azithromycin 250 MG tablet    ZITHROMAX Z-APOLONIA    6 tablet    Two tablets on the first day, then one tablet daily for the next 4 days        docusate sodium 100 MG capsule    COLACE    60 capsule    Take 1 capsule (100 mg) by mouth 2 times daily    S/P  section       ferrous sulfate 142 (45 FE) MG Tbcr    SLO-FE     Take 142 mg by mouth daily        ibuprofen 400 MG tablet    ADVIL/MOTRIN    90 tablet    Take 1-2 tablets (400-800 mg) by mouth every 6 hours as needed for other (cramping)    S/P  section       oxyCODONE 5 MG IR tablet    ROXICODONE    45 tablet    Take 1-2 tablets (5-10 mg) by mouth every 3 hours as needed for moderate to severe pain    S/P  section       PNV PO           sulfamethoxazole-trimethoprim 800-160 MG per tablet    BACTRIM DS/SEPTRA DS    10 tablet    Take 1 tablet by mouth 2 times daily for 5 days    Dysuria       TYLENOL PO      Take 650 mg by mouth as needed for mild pain or fever

## 2017-09-16 ENCOUNTER — NURSE TRIAGE (OUTPATIENT)
Dept: NURSING | Facility: CLINIC | Age: 47
End: 2017-09-16

## 2017-09-16 ENCOUNTER — TELEPHONE (OUTPATIENT)
Dept: URGENT CARE | Facility: URGENT CARE | Age: 47
End: 2017-09-16

## 2017-09-16 NOTE — NURSING NOTE
"Chief Complaint   Patient presents with     Urgent Care     UTI     Started about 4 days ago with frequency but today patient noticed pain and blood.        Initial /66 (BP Location: Right arm, Cuff Size: Adult Regular)  Pulse 65  Temp 98.8  F (37.1  C) (Oral)  SpO2 96%  Breastfeeding? Yes Estimated body mass index is 24.72 kg/(m^2) as calculated from the following:    Height as of 5/14/17: 5' 4\" (1.626 m).    Weight as of 5/14/17: 144 lb (65.3 kg).  Medication Reconciliation: complete.  FLORY Valencia      "

## 2017-09-16 NOTE — PROGRESS NOTES
Pt came here for dysuria, starting today, some blood noted on the urine, no fever or chill.     No Known Allergies    Past Medical History:   Diagnosis Date     Fibroid of cervix      Infertility     IVF         Current Outpatient Prescriptions on File Prior to Visit:  Acetaminophen (TYLENOL PO) Take 650 mg by mouth as needed for mild pain or fever   azithromycin (ZITHROMAX Z-APOLONIA) 250 MG tablet Two tablets on the first day, then one tablet daily for the next 4 days (Patient not taking: Reported on 9/15/2017)   oxyCODONE (ROXICODONE) 5 MG IR tablet Take 1-2 tablets (5-10 mg) by mouth every 3 hours as needed for moderate to severe pain (Patient not taking: Reported on 9/15/2017)   ibuprofen (ADVIL/MOTRIN) 400 MG tablet Take 1-2 tablets (400-800 mg) by mouth every 6 hours as needed for other (cramping) (Patient not taking: Reported on 9/15/2017)   docusate sodium (COLACE) 100 MG capsule Take 1 capsule (100 mg) by mouth 2 times daily (Patient not taking: Reported on 9/15/2017)   Prenatal Vit w/Xw-Ikdvjhdzx-TT (PNV PO)    ferrous sulfate (SLO-FE) 142 (45 FE) MG TBCR Take 142 mg by mouth daily     No current facility-administered medications on file prior to visit.     Social History   Substance Use Topics     Smoking status: Former Smoker     Packs/day: 0.50     Years: 20.00     Types: Cigarettes     Smokeless tobacco: Not on file     Alcohol use No       ROS:  Consitutional: As above  ENT: As above  Respiratory: As above    OBJECTIVE:  /66 (BP Location: Right arm, Cuff Size: Adult Regular)  Pulse 65  Temp 98.8  F (37.1  C) (Oral)  SpO2 96%  Breastfeeding? Yes  GENERAL APPEARANCE: healthy, alert and mild distress  EYES: conjunctiva clear  EARS:no cerumen.   Ear canals no erythema, TM's intact no erythema.    NOSE/MOUTH: Nose and mouth is normal  THROAT: no erythema w/ no tonsillar enlargement . no exudates  NECK: supple, nontender, no lymphadenopathy  RESP: lungs clear to auscultation - no rales, rhonchi or  wheezes  CV: regular rates and rhythm, normal S1 S2, no murmur noted  NEURO: awake, alert        Recent Results (from the past 168 hour(s))   *UA reflex to Microscopic and Culture ([place] and [facility] (except [facility])    Collection Time: 09/15/17  7:46 PM   Result Value Ref Range    Color Urine Brown     Appearance Urine Slightly Cloudy     Glucose Urine Negative NEG^Negative mg/dL    Bilirubin Urine Negative NEG^Negative    Ketones Urine Negative NEG^Negative mg/dL    Specific Gravity Urine 1.020 1.003 - 1.035    Blood Urine Large (A) NEG^Negative    pH Urine 5.5 5.0 - 7.0 pH    Protein Albumin Urine 100 (A) NEG^Negative mg/dL    Urobilinogen Urine 0.2 0.2 - 1.0 EU/dL    Nitrite Urine Negative NEG^Negative    Leukocyte Esterase Urine Small (A) NEG^Negative    Source Midstream Urine    Urine Microscopic    Collection Time: 09/15/17  7:46 PM   Result Value Ref Range    WBC Urine 10-25 (A) OTO2^O - 2 /HPF    RBC Urine >100 (A) OTO2^O - 2 /HPF    Squamous Epithelial /LPF Urine Few FEW^Few /LPF    Bacteria Urine Many (A) NEG^Negative /HPF        ASSESSMENT:     ICD-10-CM    1. Dysuria R30.0 *UA reflex to Microscopic and Culture ([place] and [facility] (except [place] and [place])     Urine Microscopic     Urine Culture Aerobic Bacterial     sulfamethoxazole-trimethoprim (BACTRIM DS/SEPTRA DS) 800-160 MG per tablet         PLAN:    Will start bactim bid for 5 days, tylenol and ibuprofen prn  Lots of rest and fluids.  Follow up in 1 weeks  if not better or sooner if getting worse .    Prince Claudio MD

## 2017-09-16 NOTE — TELEPHONE ENCOUNTER
Called patient and advised her that doctor was not in yet but could give her a call back but it was probably best for her to speak with her pharmacist. Patient state that she will call her pharmacist (Maci).  FLORY Valencia

## 2017-09-16 NOTE — TELEPHONE ENCOUNTER
Clinic Action Needed:Yes, Please call patient [phone]  Reason for Call:  Patient calling stating she was seen in Urgent Care for UTI yesterday and given prescription for Bactrim DS.  Patient stating she had advised Urgent Care she was breastfeeding. After obtaining prescription she had warning label on bottle and paper work not to breastfeed while taking.  Reviewed Micromedex with patient According to the American Academy of Pediatrics, Sulfamethoxazole is compatible with breast-feeding. According to the American Academy of Pediatrics, Trimethoprim is compatible with breast-feeding.  Patient requesting further information on why it was labeled not to use. Advised to contact pediatrician for ok to use while breastfeeding prior to taking.   Patient would like message left with urgent care for call back.    Reason for Disposition    Caller has NON-URGENT medication question about med that PCP prescribed and triager unable to answer question    Additional Information    Negative: Drug overdose and nurse unable to answer question    Negative: Caller requesting information not related to medicine    Negative: Caller requesting a prescription for Strep throat and has a positive culture result    Negative: Rash while taking a medication or within 3 days of stopping it    Negative: Immunization reaction suspected    Negative: [1] Asthma and [2] having symptoms of asthma (cough, wheezing, etc)    Negative: MORE THAN A DOUBLE DOSE of a prescription or over-the-counter (OTC) drug    Negative: [1] DOUBLE DOSE (an extra dose or lesser amount) of over-the-counter (OTC) drug AND [2] any symptoms (e.g., dizziness, nausea, pain, sleepiness)    Negative: [1] DOUBLE DOSE (an extra dose or lesser amount) of prescription drug AND [2] any symptoms (e.g., dizziness, nausea, pain, sleepiness)    Negative: Took another person's prescription drug    Negative: [1] DOUBLE DOSE (an extra dose or lesser amount) of prescription drug AND [2]  "NO symptoms (Exception: a double dose of antibiotics)    Negative: Diabetes drug error or overdose (e.g., insulin or extra dose)    Negative: [1] Request for URGENT new prescription or refill of \"essential\" medication (i.e., likelihood of harm to patient if not taken) AND [2] triager unable to fill per unit policy    Negative: [1] Prescription not at pharmacy AND [2] was prescribed today by PCP    Negative: Pharmacy calling with prescription questions and triager unable to answer question    Negative: Caller has URGENT medication question about med that PCP prescribed and triager unable to answer question    Protocols used: MEDICATION QUESTION CALL-ADULT-    "

## 2017-09-16 NOTE — TELEPHONE ENCOUNTER
Clinic Action Needed:Yes, Please call patient [phone]  Reason for Call:  Patient calling stating she was seen in Urgent Care for UTI yesterday and given prescription for Bactrim DS.  Patient stating she had advised Urgent Care she was breastfeeding. After obtaining prescription she had warning label on bottle and paper work not to breastfeed while taking.  Reviewed Micromedex with patient According to the American Academy of Pediatrics, Sulfamethoxazole is compatible with breast-feeding. According to the American Academy of Pediatrics, Trimethoprim is compatible with breast-feeding.  Patient requesting further information on why it was labeled not to use. Advised to contact pediatrician for ok to use while breastfeeding prior to taking.   Patient would like message left with urgent care for call back.    Jeanna Killian RN  [facility]            Reason for Disposition    Caller has NON-URGENT medication question about med that PCP prescribed and triager unable to answer question    Additional Information    Negative: Drug overdose and nurse unable to answer question    Negative: Caller requesting information not related to medicine    Negative: Caller requesting a prescription for Strep throat and has a positive culture result    Negative: Rash while taking a medication or within 3 days of stopping it    Negative: Immunization reaction suspected    Negative: [1] Asthma and [2] having symptoms of asthma (cough, wheezing, etc)    Negative: MORE THAN A DOUBLE DOSE of a prescription or over-the-counter (OTC) drug    Negative: [1] DOUBLE DOSE (an extra dose or lesser amount) of over-the-counter (OTC) drug AND [2] any symptoms (e.g., dizziness, nausea, pain, sleepiness)    Negative: [1] DOUBLE DOSE (an extra dose or lesser amount) of prescription drug AND [2] any symptoms (e.g., dizziness, nausea, pain, sleepiness)    Negative: Took another person's prescription drug    Negative: [1] DOUBLE DOSE (an extra  "dose or lesser amount) of prescription drug AND [2] NO symptoms (Exception: a double dose of antibiotics)    Negative: Diabetes drug error or overdose (e.g., insulin or extra dose)    Negative: [1] Request for URGENT new prescription or refill of \"essential\" medication (i.e., likelihood of harm to patient if not taken) AND [2] triager unable to fill per unit policy    Negative: [1] Prescription not at pharmacy AND [2] was prescribed today by PCP    Negative: Pharmacy calling with prescription questions and triager unable to answer question    Negative: Caller has URGENT medication question about med that PCP prescribed and triager unable to answer question    Protocols used: MEDICATION QUESTION CALL-ADULT-    "

## 2017-09-17 LAB
BACTERIA SPEC CULT: ABNORMAL
SPECIMEN SOURCE: ABNORMAL

## 2018-01-12 ENCOUNTER — APPOINTMENT (OUTPATIENT)
Dept: GENERAL RADIOLOGY | Facility: CLINIC | Age: 48
End: 2018-01-12
Attending: EMERGENCY MEDICINE
Payer: COMMERCIAL

## 2018-01-12 ENCOUNTER — NURSE TRIAGE (OUTPATIENT)
Dept: NURSING | Facility: CLINIC | Age: 48
End: 2018-01-12

## 2018-01-12 ENCOUNTER — APPOINTMENT (OUTPATIENT)
Dept: ULTRASOUND IMAGING | Facility: CLINIC | Age: 48
End: 2018-01-12
Attending: EMERGENCY MEDICINE
Payer: COMMERCIAL

## 2018-01-12 ENCOUNTER — HOSPITAL ENCOUNTER (EMERGENCY)
Facility: CLINIC | Age: 48
Discharge: HOME | End: 2018-01-12
Attending: EMERGENCY MEDICINE | Admitting: EMERGENCY MEDICINE
Payer: COMMERCIAL

## 2018-01-12 VITALS
WEIGHT: 145 LBS | RESPIRATION RATE: 16 BRPM | HEIGHT: 64 IN | BODY MASS INDEX: 24.75 KG/M2 | HEART RATE: 54 BPM | OXYGEN SATURATION: 99 % | TEMPERATURE: 98.6 F | SYSTOLIC BLOOD PRESSURE: 107 MMHG | DIASTOLIC BLOOD PRESSURE: 68 MMHG

## 2018-01-12 DIAGNOSIS — K80.20 CALCULUS OF GALLBLADDER WITHOUT CHOLECYSTITIS WITHOUT OBSTRUCTION: ICD-10-CM

## 2018-01-12 LAB
ALBUMIN SERPL-MCNC: 3.6 G/DL (ref 3.4–5)
ALBUMIN UR-MCNC: NEGATIVE MG/DL
ALP SERPL-CCNC: 157 U/L (ref 40–150)
ALT SERPL W P-5'-P-CCNC: 14 U/L (ref 0–50)
ANION GAP SERPL CALCULATED.3IONS-SCNC: 5 MMOL/L (ref 3–14)
APPEARANCE UR: CLEAR
AST SERPL W P-5'-P-CCNC: 14 U/L (ref 0–45)
BASOPHILS # BLD AUTO: 0 10E9/L (ref 0–0.2)
BASOPHILS NFR BLD AUTO: 0.4 %
BILIRUB SERPL-MCNC: 0.4 MG/DL (ref 0.2–1.3)
BILIRUB UR QL STRIP: NEGATIVE
BUN SERPL-MCNC: 9 MG/DL (ref 7–30)
CALCIUM SERPL-MCNC: 8.4 MG/DL (ref 8.5–10.1)
CHLORIDE SERPL-SCNC: 107 MMOL/L (ref 94–109)
CO2 SERPL-SCNC: 28 MMOL/L (ref 20–32)
COLOR UR AUTO: YELLOW
CREAT SERPL-MCNC: 0.67 MG/DL (ref 0.52–1.04)
D DIMER PPP FEU-MCNC: <0.3 UG/ML FEU (ref 0–0.5)
DIFFERENTIAL METHOD BLD: ABNORMAL
EOSINOPHIL # BLD AUTO: 0.1 10E9/L (ref 0–0.7)
EOSINOPHIL NFR BLD AUTO: 1.7 %
ERYTHROCYTE [DISTWIDTH] IN BLOOD BY AUTOMATED COUNT: 13.9 % (ref 10–15)
GFR SERPL CREATININE-BSD FRML MDRD: >90 ML/MIN/1.7M2
GLUCOSE SERPL-MCNC: 109 MG/DL (ref 70–99)
GLUCOSE UR STRIP-MCNC: NEGATIVE MG/DL
HCG UR QL: NEGATIVE
HCT VFR BLD AUTO: 33.6 % (ref 35–47)
HGB BLD-MCNC: 10.6 G/DL (ref 11.7–15.7)
HGB UR QL STRIP: NEGATIVE
IMM GRANULOCYTES # BLD: 0 10E9/L (ref 0–0.4)
IMM GRANULOCYTES NFR BLD: 0.2 %
KETONES UR STRIP-MCNC: NEGATIVE MG/DL
LEUKOCYTE ESTERASE UR QL STRIP: NEGATIVE
LIPASE SERPL-CCNC: 178 U/L (ref 73–393)
LYMPHOCYTES # BLD AUTO: 1.1 10E9/L (ref 0.8–5.3)
LYMPHOCYTES NFR BLD AUTO: 14.2 %
MCH RBC QN AUTO: 26 PG (ref 26.5–33)
MCHC RBC AUTO-ENTMCNC: 31.5 G/DL (ref 31.5–36.5)
MCV RBC AUTO: 82 FL (ref 78–100)
MONOCYTES # BLD AUTO: 0.5 10E9/L (ref 0–1.3)
MONOCYTES NFR BLD AUTO: 6.6 %
NEUTROPHILS # BLD AUTO: 6.2 10E9/L (ref 1.6–8.3)
NEUTROPHILS NFR BLD AUTO: 76.9 %
NITRATE UR QL: NEGATIVE
NRBC # BLD AUTO: 0 10*3/UL
NRBC BLD AUTO-RTO: 0 /100
PH UR STRIP: 5.5 PH (ref 5–7)
PLATELET # BLD AUTO: 303 10E9/L (ref 150–450)
POTASSIUM SERPL-SCNC: 3.7 MMOL/L (ref 3.4–5.3)
PROT SERPL-MCNC: 7.5 G/DL (ref 6.8–8.8)
RBC # BLD AUTO: 4.08 10E12/L (ref 3.8–5.2)
SODIUM SERPL-SCNC: 140 MMOL/L (ref 133–144)
SOURCE: NORMAL
SP GR UR STRIP: 1.01 (ref 1–1.03)
TROPONIN I SERPL-MCNC: <0.015 UG/L (ref 0–0.04)
UROBILINOGEN UR STRIP-ACNC: 0.2 EU/DL (ref 0.2–1)
WBC # BLD AUTO: 8 10E9/L (ref 4–11)

## 2018-01-12 PROCEDURE — 80053 COMPREHEN METABOLIC PANEL: CPT | Performed by: EMERGENCY MEDICINE

## 2018-01-12 PROCEDURE — 81025 URINE PREGNANCY TEST: CPT | Performed by: EMERGENCY MEDICINE

## 2018-01-12 PROCEDURE — 83690 ASSAY OF LIPASE: CPT | Performed by: EMERGENCY MEDICINE

## 2018-01-12 PROCEDURE — 76705 ECHO EXAM OF ABDOMEN: CPT

## 2018-01-12 PROCEDURE — 84484 ASSAY OF TROPONIN QUANT: CPT | Performed by: EMERGENCY MEDICINE

## 2018-01-12 PROCEDURE — 85379 FIBRIN DEGRADATION QUANT: CPT | Performed by: EMERGENCY MEDICINE

## 2018-01-12 PROCEDURE — 93005 ELECTROCARDIOGRAM TRACING: CPT

## 2018-01-12 PROCEDURE — 99285 EMERGENCY DEPT VISIT HI MDM: CPT | Mod: 25

## 2018-01-12 PROCEDURE — 85025 COMPLETE CBC W/AUTO DIFF WBC: CPT | Performed by: EMERGENCY MEDICINE

## 2018-01-12 PROCEDURE — 71046 X-RAY EXAM CHEST 2 VIEWS: CPT

## 2018-01-12 PROCEDURE — 81003 URINALYSIS AUTO W/O SCOPE: CPT | Performed by: EMERGENCY MEDICINE

## 2018-01-12 ASSESSMENT — ENCOUNTER SYMPTOMS
NAUSEA: 1
LIGHT-HEADEDNESS: 1
ABDOMINAL PAIN: 1
SORE THROAT: 0
BACK PAIN: 1
VOMITING: 0
DIAPHORESIS: 0
COUGH: 0

## 2018-01-12 NOTE — TELEPHONE ENCOUNTER
Reason for Disposition    [1] Chest pain lasts > 5 minutes AND [2] age > 30 AND [3] at least one cardiac risk factor (i.e., hypertension, diabetes, obesity, smoker or strong family history of heart disease)    Protocols used: CHEST PAIN-ADULT-      Chest pain came on around 4pm, lasted about 25 minutes, into back, squeezing, pressure, denies feeling faint, father has heart stent, 'not really there now,' speaking in full sentences, calm, denies breathing problems, FNA Chest pain protocol completed with recommendation for 911.  Explained importance of being seen in the ER for current condition, pt verbalizes understanding.  Pt deciding her plan.  Has been having this on and off for awhile and decided to call to get advice tonight.    Liss Browne RN  [facility]

## 2018-01-12 NOTE — ED AVS SNAPSHOT
Emergency Department    [address] [place] [id number]    Phone:  [phone]    Fax:  [phone]                                       Ethel De Luna   MRN: [id number]    Department:   Emergency Department   Date of Visit:  1/12/2018           After Visit Summary Signature Page     I have received my discharge instructions, and my questions have been answered. I have discussed any challenges I see with this plan with the nurse or doctor.    ..........................................................................................................................................  Patient/Patient Representative Signature      ..........................................................................................................................................  Patient Representative Print Name and Relationship to Patient    ..................................................               ................................................  Date                                            Time    ..........................................................................................................................................  Reviewed by Signature/Title    ...................................................              ..............................................  Date                                                            Time

## 2018-01-13 LAB — INTERPRETATION ECG - MUSE: NORMAL

## 2018-01-13 NOTE — DISCHARGE INSTRUCTIONS
Gallstones with Biliary Colic    You have abdominal pain due to irritation and spasm of the gallbladder. This is called biliary colic. The gallbladder is a small sac under the liver, which stores and releases a fluid that aids in the digestion of fat. A collection of crystals may form stones inside the gallbladder (gallstones). Gallstones can cause the gallbladder to spasm. If they block the duct out of the gallbladder, they can cause pain and even an infection.   A number of factors increase the risk for having gallstones:    Being female    Being severely overweight (obese)    Older age    Losing or gaining weight quickly    Eating a high-calorie diet    Being pregnant    Taking hormone therapy    Having diabetes  Home care    Rest in bed.    Drink only clear liquids until you feel better.    You may have been prescribed medicine for pain or nausea. Take these as directed.    Fat in your diet makes the gallbladder contract and may cause increased pain. Avoid foods that are high in fat (such as full-fat dairy, fried foods, and fatty meats) for at least two days.    If you are overweight, talk to your healthcare provider about losing weight.  Follow-up care  Follow up with your healthcare provider or as advised. There is a chance that you will have another episode of pain from your gallstones at some point. Removal of the gallbladder is an option to prevent this. Talk with your healthcare provider about your treatment options.  When to seek medical advice  Call your healthcare provider if any of the following occur:    Worsening pain or pain lasting for longer than 6 hours    Pain moving to the right lower abdomen    Repeated vomiting    Swollen abdomen    Fever of 100.4 F (38 C) or higher, or as directed by your healthcare provider    Very dark urine, light colored stools, or yellow color of the skin or eyes    Chest, arm, back, neck or jaw pain  Date Last Reviewed: 6/18/2015 2000-2017 [employer],  [facility]. [address] [place] [address]. All rights reserved. This information is not intended as a substitute for professional medical care. Always follow your healthcare professional's instructions.

## 2018-01-13 NOTE — ED PROVIDER NOTES
History     Chief Complaint:  Chest Pain    HPI   Ethel De Luna is a 47 year old female who presents to the ED for evaluation of chest pain. The patient reports she has had intermittent sudden onset of chest pain for the past 3 weeks; she can have 2-3 episodes per week and a couple episodes per day. The patient notes the chest tightness lasted an hour today which is longer than usual. The patient reports the pain radiates into her back and intermittently into her epigastric region. The patient notes she has associated lightheadedness and nausea. The patient denies any rash, diaphoresis, sore throat, cough, leg swelling, vomiting, urinary symptoms, or bowel changes.       CARDIAC RISK FACTORS:  Sex:    Female  Tobacco:   Former smoker  Hypertension:   No  Hyperlipidemia:  No  Diabetes:   No  Family History:  No    PE/DVT RISK FACTORS:  Sex:    Female  Hormones:   No  Tobacco:   Former smoker  Cancer:   No  Travel:   No  Surgery:   No  Other immobilization: No  Personal history:  No  Family history:  No    Allergies:  No known drug allergies    Medications:    Ferrous sulfate    Past Medical History:    Cervix fibroid   Infertility  Anemia    Past Surgical History:     x2  Right hip surgery     Family History:    History reviewed. No pertinent family history.     Social History:  Smoking status: Former smoker  Alcohol use: No  Presents to ED alone   Marital Status:   [2]     Review of Systems   Constitutional: Negative for diaphoresis.   HENT: Negative for sore throat.    Respiratory: Negative for cough.    Cardiovascular: Positive for chest pain. Negative for leg swelling.   Gastrointestinal: Positive for abdominal pain and nausea. Negative for vomiting.   Musculoskeletal: Positive for back pain.   Skin: Negative for rash.   Neurological: Positive for light-headedness.   All other systems reviewed and are negative.    Physical Exam     Patient Vitals for the past 24 hrs:   BP Temp Temp src  "Pulse Resp SpO2 Height Weight   01/12/18 2126 - - - - - 99 % - -   01/12/18 2124 107/68 - - - - - - -   01/12/18 1754 138/65 98.6  F (37  C) Temporal 54 16 99 % 1.626 m (5' 4\") 65.8 kg (145 lb)     Physical Exam  Constitutional: White female. Supine.   HENT: No signs of trauma.   Eyes: EOM are normal. Pupils are equal, round, and reactive to light.   Neck: Normal range of motion. No JVD present. No cervical adenopathy.  Cardiovascular: 2+ femoral and radial pulses. Regular rhythm.  Exam reveals no gallop and no friction rub.    No murmur heard.  Pulmonary/Chest: Bilateral breath sounds normal. No wheezes, rhonchi or rales.  Abdominal: Soft. No tenderness. No rebound or guarding.   Musculoskeletal: No edema. No tenderness.   Lymphadenopathy: No lymphadenopathy.   Neurological: Alert and oriented to person, place, and time. Normal strength. Coordination normal.   Skin: Skin is warm and dry. No rash noted. No erythema.     Emergency Department Course     ECG (17:53:21):  Rate 53 bpm. MA interval 142. QRS duration 88. QT/QTc 436/409. P-R-T axes 46 48 42. Sinus bradycardia. Interpreted at 1833 by Homero Chau MD.    Imaging:  Radiographic findings were communicated with the patient who voiced understanding of the findings.    XR Chest 2 Views  IMPRESSION: No active infiltrates are identified. As read by Radiology.       Abdomen US, Limited RUQ (Only)  IMPRESSION:  Gallbladder is poorly seen. It is contracted. There is  echogenic structure in what appears to be the neck of the gallbladder.  Wall of the gallbladder appears thickened but the gallbladder is not  distended. Patient was not focally tender over the gallbladder. These  findings could be due to cholecystitis but again the gallbladder was  not ideally seen. Correlation with clinical symptoms is suggested. As read by Radiology.    Laboratory:  UA: Negative  HCG urine: Negative   D dimer: <0.3  Lipase: 178  Troponin I (1830): <0.015  CBC: WBC 8.0, HGB " 10.6,    CMP: Glucose 109, Calcium 8.4, Alkphos 157, o/w WNL (Creatinine 0.67)    Emergency Department Course:  Past medical records, nursing notes, and vitals reviewed.  1816: I performed an exam of the patient and obtained history, as documented above.  IV inserted and blood drawn.    The patient was sent for a chest x-ray and limited abdomen ultrasound while in the emergency department, findings above.    2058: I rechecked the patient. Explained findings to patient.    2118: I rechecked the patient. Findings and plan explained to the Patient. Patient discharged home with instructions regarding supportive care, medications, and reasons to return. The importance of close follow-up was reviewed.     Impression & Plan      Medical Decision Making:  This is a 47 year old woman who comes in with intermittent lower chest and back pain for the past 3 weeks. It sometimes comes on during the day while at rest. Sometimes when eating. Usually never when up and active. She does not have the pain now, but it lasted for at least an hour, and she decided to come in and be seen. Exam is unremarkable at this time. Her workup includes EKG, chest x-ray, blood troponin, urine, and D dimer. Her alkphos was minimally elevated. I also obtained a gallbladder ultrasound which does show some questionable wall thickening and what appears to be stones in the cystic duct. There was no sign of common duct obstruction, and she does not appear to be acutely ill with cholecystitis at this time. I think this is likely the cause. I have asked her to follow-up with Dr. Jorje Hernandez and also to start Omeprazole. She should avoid fatty and greasy foods. If her pain worsens, persistent vomiting, or fevers, return to the ED.     Diagnosis:    ICD-10-CM   1. Calculus of gallbladder without cholecystitis without obstruction K80.20     Disposition: Patient discharged to home     Yu Galvan  1/12/2018    EMERGENCY DEPARTMENT    Yu HDZ  Mandy, am serving as a scribe at 6:16 PM on 1/12/2018 to document services personally performed by Homero Chau MD based on my observations and the provider's statements to me.        Homero Chau MD  01/12/18 6547

## 2018-01-13 NOTE — ED NOTES
Presents to ED with upper chest pain for the last 3 weeks.  Has been having episodes of nausea but no vomiting during this time.

## 2018-01-22 ENCOUNTER — TRANSFERRED RECORDS (OUTPATIENT)
Dept: HEALTH INFORMATION MANAGEMENT | Facility: CLINIC | Age: 48
End: 2018-01-22

## 2018-01-24 LAB
ALT SERPL-CCNC: 8 IU/L (ref 0–32)
AST SERPL-CCNC: 13 IU/L (ref 0–40)
CHOLEST SERPL-MCNC: 157 MG/DL (ref 100–199)
CREAT SERPL-MCNC: 0.65 MG/DL (ref 0.57–1)
GFR SERPL CREATININE-BSD FRML MDRD: 107 ML/MIN/1.73M2
GLUCOSE SERPL-MCNC: 86 MG/DL (ref 65–99)
HDLC SERPL-MCNC: 58 MG/DL
LDLC SERPL CALC-MCNC: 65 MG/DL (ref 0–99)
POTASSIUM SERPL-SCNC: 4.7 MMOL/L (ref 3.5–5.2)
TRIGL SERPL-MCNC: 71 MG/DL (ref 0–149)

## 2018-01-25 ENCOUNTER — OFFICE VISIT (OUTPATIENT)
Dept: SURGERY | Facility: CLINIC | Age: 48
End: 2018-01-25
Payer: COMMERCIAL

## 2018-01-25 VITALS
BODY MASS INDEX: 24.75 KG/M2 | HEIGHT: 64 IN | HEART RATE: 66 BPM | SYSTOLIC BLOOD PRESSURE: 113 MMHG | WEIGHT: 145 LBS | DIASTOLIC BLOOD PRESSURE: 70 MMHG

## 2018-01-25 DIAGNOSIS — K80.50 BILIARY COLIC: Primary | ICD-10-CM

## 2018-01-25 PROCEDURE — 99204 OFFICE O/P NEW MOD 45 MIN: CPT | Performed by: SURGERY

## 2018-01-25 NOTE — LETTER
"2018    Re: Ethel De Luna - 1970    History of Present Illness:  Ethel De Luna is a 47 year old female who presented with several episodes of upper abdominal pain and intermittent nausea, usually after eating. Commonly associated with eating greasy foods.  Pain radiates into the back and shoulder. Was seen in ER some weeks ago, labs were fairly normal.  RUQ U/S was obtained and this showed thickened GB wall, possible stone in neck(scan was poor quality).  The patient is now here to discuss diagnosis and management options.     PMH:  Ethel De Luna  has a past medical history of Fibroid of cervix and Infertility.  PSH:  Ethel De Luna  has a past surgical history that includes orthopedic surgery;  section (N/A, 2015); and  section (N/A, 3/20/2017).     Home medications and allergies reviewed.     Social History:  Ethel De Luna  reports that she has quit smoking. Her smoking use included Cigarettes. She has a 10.00 pack-year smoking history. She has never used smokeless tobacco. She reports that she does not drink alcohol or use illicit drugs.  Family History:  Ethel De Luna family history is not on file.     ROS:  The 10 point Review of Systems is negative other than noted in the HPI.  Nausea, pain as described.     Physical Exam:  Blood pressure 113/70, pulse 66, height 5' 4\" (1.626 m), weight 145 lb (65.8 kg), last menstrual period 2017, currently breastfeeding.  145 lbs 0 oz  Healthy female in no distress.  Patient has a pleasant affect, speaks without difficulty.   Pupils equal round and reactive to light.   No cervical lymphadenopathy or thyromegaly.   Lung fields clear, breathing comfortably.   Heart normal sinus rhythm.  No murmurs rubs or gallops.  Abdomen soft, nontender, nondistended.  Minimal tenderness in the right upper quadrant, no masses appreciated. No peritoneal signs or rebound.  Skin warm, dry, without " rashes or lesions.     All new lab and imaging data was reviewed.  Abdominal ultrasound shows gallbladder wall thickening, probable gallstone.      Assessment/plan:  Ethel De Luna is a 47 year old female with signs and symptoms suggesting symptomatic cholelithiasis. I've recommended laparoscopic cholecystectomy. Surgical comorbidities include previous .  She had questions about the certainty of the diagnosis, and I reassured her I felt it was a classic presentation.  If she would like, a referral for HIDA scan or GI consult could be ordered.   I feel the patient is a good candidate for the surgery and that this should be able to be done as an outpatient. They were going to consider their options and likely schedule surgery.     Gatito Garrett M.D.  [facility]  [phone]

## 2018-01-25 NOTE — MR AVS SNAPSHOT
"              After Visit Summary   2018    Ethel De Luna    MRN: [id number]           Patient Information     Date Of Birth          1970        Visit Information        Provider Department      2018 2:30 PM Jac Garrett MD [facility] General Surgery       Follow-ups after your visit        Who to contact     If you have questions or need follow up information about today's clinic visit or your schedule please contact [facility] directly at [phone].  Normal or non-critical lab and imaging results will be communicated to you by R[facility], letter or phone within 4 business days after the clinic has received the results. If you do not hear from us within 7 days, please contact the clinic through [facility] or phone. If you have a critical or abnormal lab result, we will notify you by phone as soon as possible.  Submit refill requests through [facility] or call your pharmacy and they will forward the refill request to us. Please allow 3 business days for your refill to be completed.          Additional Information About Your Visit        MyChart Information     [facility] lets you send messages to your doctor, view your test results, renew your prescriptions, schedule appointments and more. To sign up, go to [email or web address] . Click on \"Log in\" on the left side of the screen, which will take you to the Welcome page. Then click on \"Sign up Now\" on the right side of the page.     You will be asked to enter the access code listed below, as well as some personal information. Please follow the directions to create your username and password.     Your access code is: [email or web address]  Expires: 2018  9:28 PM     Your access code will  in 90 days. If you need help or a new code, please call your [facility] clinic or [phone].        Care EveryWhere ID     This is your Care EveryWhere ID. This could be used by other " "organizations to access your [facility] medical records  [id number]        Your Vitals Were     Pulse Height Last Period BMI (Body Mass Index)          66 5' 4\" (1.626 m) 12/19/2017 (Approximate) 24.89 kg/m2         Blood Pressure from Last 3 Encounters:   01/25/18 113/70   01/12/18 107/68   09/15/17 113/66    Weight from Last 3 Encounters:   01/25/18 145 lb (65.8 kg)   01/12/18 145 lb (65.8 kg)   05/14/17 144 lb (65.3 kg)              Today, you had the following     No orders found for display         Today's Medication Changes          These changes are accurate as of 1/25/18  3:09 PM.  If you have any questions, ask your nurse or doctor.               Stop taking these medicines if you haven't already. Please contact your care team if you have questions.     azithromycin 250 MG tablet   Commonly known as:  ZITHROMAX Z-APOLONIA   Stopped by:  Jac Garrett MD           docusate sodium 100 MG capsule   Commonly known as:  COLACE   Stopped by:  Jac Garrett MD           ferrous sulfate 142 (45 FE) MG Tbcr   Commonly known as:  SLO-FE   Stopped by:  Jac Garrett MD           ibuprofen 400 MG tablet   Commonly known as:  ADVIL/MOTRIN   Stopped by:  Jac Garrett MD           oxyCODONE IR 5 MG tablet   Commonly known as:  ROXICODONE   Stopped by:  Jac Garrett MD           PNV PO   Stopped by:  Jac Garrett MD           TYLENOL PO   Stopped by:  Jac Garrett MD                    Primary Care Provider Office Phone # Faf # Rhzcbzew P MD Stepan [phone]       [facility] [address]  [place] [address]        Equal Access to Services     [facility] CenterELMIRA AH: Hadii aad ku hadarmanio Soomaali, waaxda luqadaha, qaybta kaalmada adeegyada, bonita delacruzn brandon oneal. So [facility] [phone].    ATENCIÓN: Si habla español, tiene a anna disposición servicios gratuitos de asistencia lingüística. Llame al " [phone].    We comply with applicable federal civil rights laws and [place] laws. We do not discriminate on the basis of race, color, national origin, age, disability, sex, sexual orientation, or gender identity.            Thank you!     Thank you for choosing [facility]  for your care. Our goal is always to provide you with excellent care. Hearing back from our patients is one way we can continue to improve our services. Please take a few minutes to complete the written survey that you may receive in the mail after your visit with us. Thank you!             Your Updated Medication List - Protect others around you: Learn how to safely use, store and throw away your medicines at www.disposemymeds.org.      Notice  As of 1/25/2018  3:09 PM    You have not been prescribed any medications.

## 2018-02-20 RX ORDER — FERROUS SULFATE 325(65) MG
TABLET ORAL
COMMUNITY
End: 2019-10-14

## 2018-02-21 ENCOUNTER — ANESTHESIA (OUTPATIENT)
Dept: SURGERY | Facility: CLINIC | Age: 48
End: 2018-02-21
Payer: COMMERCIAL

## 2018-02-21 ENCOUNTER — OFFICE VISIT (OUTPATIENT)
Dept: SURGERY | Facility: PHYSICIAN GROUP | Age: 48
End: 2018-02-21
Payer: COMMERCIAL

## 2018-02-21 ENCOUNTER — ANESTHESIA EVENT (OUTPATIENT)
Dept: SURGERY | Facility: CLINIC | Age: 48
End: 2018-02-21
Payer: COMMERCIAL

## 2018-02-21 ENCOUNTER — SURGERY (OUTPATIENT)
Age: 48
End: 2018-02-21

## 2018-02-21 ENCOUNTER — APPOINTMENT (OUTPATIENT)
Dept: GENERAL RADIOLOGY | Facility: CLINIC | Age: 48
End: 2018-02-21
Attending: SURGERY
Payer: COMMERCIAL

## 2018-02-21 ENCOUNTER — HOSPITAL ENCOUNTER (OUTPATIENT)
Facility: CLINIC | Age: 48
Discharge: HOME | End: 2018-02-23
Attending: SURGERY | Admitting: SURGERY
Payer: COMMERCIAL

## 2018-02-21 DIAGNOSIS — G89.18 ACUTE POST-OPERATIVE PAIN: Primary | ICD-10-CM

## 2018-02-21 DIAGNOSIS — R93.1 ABNORMAL ULTRASOUND CARDIOGRAM: ICD-10-CM

## 2018-02-21 DIAGNOSIS — R94.31 ABNORMAL ELECTROCARDIOGRAM: ICD-10-CM

## 2018-02-21 PROBLEM — I49.9 DYSRHYTHMIA, CARDIAC: Status: ACTIVE | Noted: 2018-02-21

## 2018-02-21 LAB — HCG UR QL: NEGATIVE

## 2018-02-21 PROCEDURE — 93005 ELECTROCARDIOGRAM TRACING: CPT

## 2018-02-21 PROCEDURE — 88304 TISSUE EXAM BY PATHOLOGIST: CPT | Mod: 26 | Performed by: SURGERY

## 2018-02-21 PROCEDURE — 88304 TISSUE EXAM BY PATHOLOGIST: CPT | Performed by: SURGERY

## 2018-02-21 PROCEDURE — 40000170 ZZH STATISTIC PRE-PROCEDURE ASSESSMENT II: Performed by: SURGERY

## 2018-02-21 PROCEDURE — 71000012 ZZH RECOVERY PHASE 1 LEVEL 1 FIRST HR: Performed by: SURGERY

## 2018-02-21 PROCEDURE — 25000132 ZZH RX MED GY IP 250 OP 250 PS 637: Performed by: PHYSICIAN ASSISTANT

## 2018-02-21 PROCEDURE — 27210794 ZZH OR GENERAL SUPPLY STERILE: Performed by: SURGERY

## 2018-02-21 PROCEDURE — 37000009 ZZH ANESTHESIA TECHNICAL FEE, EACH ADDTL 15 MIN: Performed by: SURGERY

## 2018-02-21 PROCEDURE — 25000128 H RX IP 250 OP 636: Performed by: SURGERY

## 2018-02-21 PROCEDURE — 93010 ELECTROCARDIOGRAM REPORT: CPT | Performed by: INTERNAL MEDICINE

## 2018-02-21 PROCEDURE — 25800025 ZZH RX 258: Performed by: SURGERY

## 2018-02-21 PROCEDURE — 27210995 ZZH RX 272: Performed by: SURGERY

## 2018-02-21 PROCEDURE — 25000125 ZZHC RX 250: Performed by: SURGERY

## 2018-02-21 PROCEDURE — 36000093 ZZH SURGERY LEVEL 4 1ST 30 MIN: Performed by: SURGERY

## 2018-02-21 PROCEDURE — 25000128 H RX IP 250 OP 636: Performed by: NURSE ANESTHETIST, CERTIFIED REGISTERED

## 2018-02-21 PROCEDURE — 37000008 ZZH ANESTHESIA TECHNICAL FEE, 1ST 30 MIN: Performed by: SURGERY

## 2018-02-21 PROCEDURE — 47563 LAPARO CHOLECYSTECTOMY/GRAPH: CPT | Mod: 22 | Performed by: SURGERY

## 2018-02-21 PROCEDURE — 25000132 ZZH RX MED GY IP 250 OP 250 PS 637: Performed by: SURGERY

## 2018-02-21 PROCEDURE — 40000277 XR SURGERY CARM FLUORO LESS THAN 5 MIN W STILLS

## 2018-02-21 PROCEDURE — 25000132 ZZH RX MED GY IP 250 OP 250 PS 637: Performed by: INTERNAL MEDICINE

## 2018-02-21 PROCEDURE — 81025 URINE PREGNANCY TEST: CPT | Performed by: SURGERY

## 2018-02-21 PROCEDURE — 71000013 ZZH RECOVERY PHASE 1 LEVEL 1 EA ADDTL HR: Performed by: SURGERY

## 2018-02-21 PROCEDURE — 25000128 H RX IP 250 OP 636: Performed by: ANESTHESIOLOGY

## 2018-02-21 PROCEDURE — 47563 LAPARO CHOLECYSTECTOMY/GRAPH: CPT | Mod: AS | Performed by: PHYSICIAN ASSISTANT

## 2018-02-21 PROCEDURE — 36000063 ZZH SURGERY LEVEL 4 EA 15 ADDTL MIN: Performed by: SURGERY

## 2018-02-21 PROCEDURE — 25000125 ZZHC RX 250: Performed by: NURSE ANESTHETIST, CERTIFIED REGISTERED

## 2018-02-21 PROCEDURE — 25000566 ZZH SEVOFLURANE, EA 15 MIN: Performed by: SURGERY

## 2018-02-21 RX ORDER — LORAZEPAM 0.5 MG/1
0.5 TABLET ORAL
Status: DISCONTINUED | OUTPATIENT
Start: 2018-02-21 | End: 2018-02-23 | Stop reason: HOSPADM

## 2018-02-21 RX ORDER — GLYCOPYRROLATE 0.2 MG/ML
INJECTION, SOLUTION INTRAMUSCULAR; INTRAVENOUS PRN
Status: DISCONTINUED | OUTPATIENT
Start: 2018-02-21 | End: 2018-02-21

## 2018-02-21 RX ORDER — PROPOFOL 10 MG/ML
INJECTION, EMULSION INTRAVENOUS PRN
Status: DISCONTINUED | OUTPATIENT
Start: 2018-02-21 | End: 2018-02-21

## 2018-02-21 RX ORDER — HYDROMORPHONE HYDROCHLORIDE 1 MG/ML
0.2 INJECTION, SOLUTION INTRAMUSCULAR; INTRAVENOUS; SUBCUTANEOUS
Status: DISCONTINUED | OUTPATIENT
Start: 2018-02-21 | End: 2018-02-23 | Stop reason: HOSPADM

## 2018-02-21 RX ORDER — BUPIVACAINE HYDROCHLORIDE AND EPINEPHRINE 2.5; 5 MG/ML; UG/ML
INJECTION, SOLUTION INFILTRATION; PERINEURAL PRN
Status: DISCONTINUED | OUTPATIENT
Start: 2018-02-21 | End: 2018-02-21 | Stop reason: HOSPADM

## 2018-02-21 RX ORDER — ONDANSETRON 4 MG/1
4 TABLET, ORALLY DISINTEGRATING ORAL EVERY 6 HOURS PRN
Status: DISCONTINUED | OUTPATIENT
Start: 2018-02-21 | End: 2018-02-23 | Stop reason: HOSPADM

## 2018-02-21 RX ORDER — SODIUM CHLORIDE, SODIUM LACTATE, POTASSIUM CHLORIDE, CALCIUM CHLORIDE 600; 310; 30; 20 MG/100ML; MG/100ML; MG/100ML; MG/100ML
INJECTION, SOLUTION INTRAVENOUS CONTINUOUS PRN
Status: DISCONTINUED | OUTPATIENT
Start: 2018-02-21 | End: 2018-02-21

## 2018-02-21 RX ORDER — ONDANSETRON 2 MG/ML
4 INJECTION INTRAMUSCULAR; INTRAVENOUS EVERY 30 MIN PRN
Status: DISCONTINUED | OUTPATIENT
Start: 2018-02-21 | End: 2018-02-21 | Stop reason: HOSPADM

## 2018-02-21 RX ORDER — DEXTROSE MONOHYDRATE, SODIUM CHLORIDE, AND POTASSIUM CHLORIDE 50; 1.49; 4.5 G/1000ML; G/1000ML; G/1000ML
INJECTION, SOLUTION INTRAVENOUS CONTINUOUS
Status: DISCONTINUED | OUTPATIENT
Start: 2018-02-21 | End: 2018-02-23 | Stop reason: HOSPADM

## 2018-02-21 RX ORDER — ACETAMINOPHEN 325 MG/1
650 TABLET ORAL EVERY 6 HOURS PRN
Status: DISCONTINUED | OUTPATIENT
Start: 2018-02-21 | End: 2018-02-23 | Stop reason: HOSPADM

## 2018-02-21 RX ORDER — HYDROCODONE BITARTRATE AND ACETAMINOPHEN 5; 325 MG/1; MG/1
1 TABLET ORAL
Status: COMPLETED | OUTPATIENT
Start: 2018-02-21 | End: 2018-02-21

## 2018-02-21 RX ORDER — DEXAMETHASONE SODIUM PHOSPHATE 4 MG/ML
INJECTION, SOLUTION INTRA-ARTICULAR; INTRALESIONAL; INTRAMUSCULAR; INTRAVENOUS; SOFT TISSUE PRN
Status: DISCONTINUED | OUTPATIENT
Start: 2018-02-21 | End: 2018-02-21

## 2018-02-21 RX ORDER — NEOSTIGMINE METHYLSULFATE 1 MG/ML
VIAL (ML) INJECTION PRN
Status: DISCONTINUED | OUTPATIENT
Start: 2018-02-21 | End: 2018-02-21

## 2018-02-21 RX ORDER — HYDROCODONE BITARTRATE AND ACETAMINOPHEN 5; 325 MG/1; MG/1
1-2 TABLET ORAL EVERY 4 HOURS PRN
Status: DISCONTINUED | OUTPATIENT
Start: 2018-02-21 | End: 2018-02-22

## 2018-02-21 RX ORDER — SODIUM CHLORIDE, SODIUM LACTATE, POTASSIUM CHLORIDE, CALCIUM CHLORIDE 600; 310; 30; 20 MG/100ML; MG/100ML; MG/100ML; MG/100ML
INJECTION, SOLUTION INTRAVENOUS CONTINUOUS
Status: DISCONTINUED | OUTPATIENT
Start: 2018-02-21 | End: 2018-02-21 | Stop reason: HOSPADM

## 2018-02-21 RX ORDER — FENTANYL CITRATE 50 UG/ML
INJECTION, SOLUTION INTRAMUSCULAR; INTRAVENOUS PRN
Status: DISCONTINUED | OUTPATIENT
Start: 2018-02-21 | End: 2018-02-21

## 2018-02-21 RX ORDER — ONDANSETRON 2 MG/ML
INJECTION INTRAMUSCULAR; INTRAVENOUS PRN
Status: DISCONTINUED | OUTPATIENT
Start: 2018-02-21 | End: 2018-02-21

## 2018-02-21 RX ORDER — MAGNESIUM HYDROXIDE 1200 MG/15ML
LIQUID ORAL PRN
Status: DISCONTINUED | OUTPATIENT
Start: 2018-02-21 | End: 2018-02-21 | Stop reason: HOSPADM

## 2018-02-21 RX ORDER — LIDOCAINE 40 MG/G
CREAM TOPICAL
Status: DISCONTINUED | OUTPATIENT
Start: 2018-02-21 | End: 2018-02-23 | Stop reason: HOSPADM

## 2018-02-21 RX ORDER — ONDANSETRON 2 MG/ML
4 INJECTION INTRAMUSCULAR; INTRAVENOUS EVERY 6 HOURS PRN
Status: DISCONTINUED | OUTPATIENT
Start: 2018-02-21 | End: 2018-02-23 | Stop reason: HOSPADM

## 2018-02-21 RX ORDER — EPHEDRINE SULFATE 50 MG/ML
INJECTION, SOLUTION INTRAMUSCULAR; INTRAVENOUS; SUBCUTANEOUS PRN
Status: DISCONTINUED | OUTPATIENT
Start: 2018-02-21 | End: 2018-02-21

## 2018-02-21 RX ORDER — HYDROCODONE BITARTRATE AND ACETAMINOPHEN 5; 325 MG/1; MG/1
1-2 TABLET ORAL EVERY 4 HOURS PRN
Qty: 30 TABLET | Refills: 0 | Status: SHIPPED | OUTPATIENT
Start: 2018-02-21 | End: 2018-02-22

## 2018-02-21 RX ORDER — NALOXONE HYDROCHLORIDE 0.4 MG/ML
.1-.4 INJECTION, SOLUTION INTRAMUSCULAR; INTRAVENOUS; SUBCUTANEOUS
Status: DISCONTINUED | OUTPATIENT
Start: 2018-02-21 | End: 2018-02-21 | Stop reason: HOSPADM

## 2018-02-21 RX ORDER — MEPERIDINE HYDROCHLORIDE 25 MG/ML
12.5 INJECTION INTRAMUSCULAR; INTRAVENOUS; SUBCUTANEOUS
Status: DISCONTINUED | OUTPATIENT
Start: 2018-02-21 | End: 2018-02-21 | Stop reason: HOSPADM

## 2018-02-21 RX ORDER — CEFAZOLIN SODIUM 2 G/100ML
2 INJECTION, SOLUTION INTRAVENOUS
Status: COMPLETED | OUTPATIENT
Start: 2018-02-21 | End: 2018-02-21

## 2018-02-21 RX ORDER — PROPOFOL 10 MG/ML
INJECTION, EMULSION INTRAVENOUS CONTINUOUS PRN
Status: DISCONTINUED | OUTPATIENT
Start: 2018-02-21 | End: 2018-02-21

## 2018-02-21 RX ORDER — NALOXONE HYDROCHLORIDE 0.4 MG/ML
.1-.4 INJECTION, SOLUTION INTRAMUSCULAR; INTRAVENOUS; SUBCUTANEOUS
Status: DISCONTINUED | OUTPATIENT
Start: 2018-02-21 | End: 2018-02-23 | Stop reason: HOSPADM

## 2018-02-21 RX ORDER — LIDOCAINE HYDROCHLORIDE 20 MG/ML
INJECTION, SOLUTION INFILTRATION; PERINEURAL PRN
Status: DISCONTINUED | OUTPATIENT
Start: 2018-02-21 | End: 2018-02-21

## 2018-02-21 RX ORDER — FENTANYL CITRATE 50 UG/ML
25-50 INJECTION, SOLUTION INTRAMUSCULAR; INTRAVENOUS
Status: DISCONTINUED | OUTPATIENT
Start: 2018-02-21 | End: 2018-02-21 | Stop reason: HOSPADM

## 2018-02-21 RX ORDER — AMOXICILLIN 250 MG
1-2 CAPSULE ORAL 2 TIMES DAILY
Qty: 20 TABLET | Refills: 0 | Status: SHIPPED | OUTPATIENT
Start: 2018-02-21 | End: 2019-10-14

## 2018-02-21 RX ORDER — HYDROMORPHONE HYDROCHLORIDE 1 MG/ML
.3-.5 INJECTION, SOLUTION INTRAMUSCULAR; INTRAVENOUS; SUBCUTANEOUS EVERY 10 MIN PRN
Status: DISCONTINUED | OUTPATIENT
Start: 2018-02-21 | End: 2018-02-21 | Stop reason: HOSPADM

## 2018-02-21 RX ORDER — IOPAMIDOL 612 MG/ML
INJECTION, SOLUTION INTRAVASCULAR PRN
Status: DISCONTINUED | OUTPATIENT
Start: 2018-02-21 | End: 2018-02-21 | Stop reason: HOSPADM

## 2018-02-21 RX ORDER — ONDANSETRON 4 MG/1
4 TABLET, ORALLY DISINTEGRATING ORAL EVERY 30 MIN PRN
Status: DISCONTINUED | OUTPATIENT
Start: 2018-02-21 | End: 2018-02-21 | Stop reason: HOSPADM

## 2018-02-21 RX ORDER — CEFAZOLIN SODIUM 1 G
1 VIAL (EA) INJECTION SEE ADMIN INSTRUCTIONS
Status: DISCONTINUED | OUTPATIENT
Start: 2018-02-21 | End: 2018-02-21 | Stop reason: HOSPADM

## 2018-02-21 RX ADMIN — PROPOFOL 50 MCG/KG/MIN: 10 INJECTION, EMULSION INTRAVENOUS at 12:02

## 2018-02-21 RX ADMIN — FENTANYL CITRATE 50 MCG: 50 INJECTION, SOLUTION INTRAMUSCULAR; INTRAVENOUS at 14:03

## 2018-02-21 RX ADMIN — GLYCOPYRROLATE 0.2 MG: 0.2 INJECTION, SOLUTION INTRAMUSCULAR; INTRAVENOUS at 12:09

## 2018-02-21 RX ADMIN — ROCURONIUM BROMIDE 10 MG: 10 INJECTION INTRAVENOUS at 12:57

## 2018-02-21 RX ADMIN — Medication 5 MG: at 12:07

## 2018-02-21 RX ADMIN — GLYCOPYRROLATE 0.4 MG: 0.2 INJECTION, SOLUTION INTRAMUSCULAR; INTRAVENOUS at 13:33

## 2018-02-21 RX ADMIN — FENTANYL CITRATE 50 MCG: 50 INJECTION, SOLUTION INTRAMUSCULAR; INTRAVENOUS at 15:33

## 2018-02-21 RX ADMIN — DEXAMETHASONE SODIUM PHOSPHATE 4 MG: 4 INJECTION, SOLUTION INTRA-ARTICULAR; INTRALESIONAL; INTRAMUSCULAR; INTRAVENOUS; SOFT TISSUE at 12:04

## 2018-02-21 RX ADMIN — MIDAZOLAM 2 MG: 1 INJECTION INTRAMUSCULAR; INTRAVENOUS at 11:47

## 2018-02-21 RX ADMIN — Medication 3 MG: at 21:08

## 2018-02-21 RX ADMIN — HYDROCODONE BITARTRATE AND ACETAMINOPHEN 1 TABLET: 5; 325 TABLET ORAL at 17:45

## 2018-02-21 RX ADMIN — FENTANYL CITRATE 100 MCG: 50 INJECTION, SOLUTION INTRAMUSCULAR; INTRAVENOUS at 11:50

## 2018-02-21 RX ADMIN — CEFAZOLIN SODIUM 2 G: 2 INJECTION, SOLUTION INTRAVENOUS at 11:49

## 2018-02-21 RX ADMIN — ROCURONIUM BROMIDE 40 MG: 10 INJECTION INTRAVENOUS at 11:50

## 2018-02-21 RX ADMIN — HYDROCODONE BITARTRATE AND ACETAMINOPHEN 1 TABLET: 5; 325 TABLET ORAL at 23:24

## 2018-02-21 RX ADMIN — PHENYLEPHRINE HYDROCHLORIDE 50 MCG: 10 INJECTION INTRAVENOUS at 12:05

## 2018-02-21 RX ADMIN — HYDROMORPHONE HYDROCHLORIDE 0.5 MG: 1 INJECTION, SOLUTION INTRAMUSCULAR; INTRAVENOUS; SUBCUTANEOUS at 14:43

## 2018-02-21 RX ADMIN — ROCURONIUM BROMIDE 5 MG: 10 INJECTION INTRAVENOUS at 12:41

## 2018-02-21 RX ADMIN — PROPOFOL 200 MG: 10 INJECTION, EMULSION INTRAVENOUS at 11:50

## 2018-02-21 RX ADMIN — Medication 5 MG: at 12:04

## 2018-02-21 RX ADMIN — SODIUM CHLORIDE, POTASSIUM CHLORIDE, SODIUM LACTATE AND CALCIUM CHLORIDE: 600; 310; 30; 20 INJECTION, SOLUTION INTRAVENOUS at 11:47

## 2018-02-21 RX ADMIN — ROCURONIUM BROMIDE 10 MG: 10 INJECTION INTRAVENOUS at 12:05

## 2018-02-21 RX ADMIN — HYDROMORPHONE HYDROCHLORIDE 0.2 MG: 1 INJECTION, SOLUTION INTRAMUSCULAR; INTRAVENOUS; SUBCUTANEOUS at 22:19

## 2018-02-21 RX ADMIN — NEOSTIGMINE METHYLSULFATE 3 MG: 1 INJECTION INTRAMUSCULAR; INTRAVENOUS; SUBCUTANEOUS at 13:33

## 2018-02-21 RX ADMIN — PHENYLEPHRINE HYDROCHLORIDE 100 MCG: 10 INJECTION INTRAVENOUS at 11:57

## 2018-02-21 RX ADMIN — SODIUM CHLORIDE 1000 ML: 900 IRRIGANT IRRIGATION at 10:59

## 2018-02-21 RX ADMIN — DEXMEDETOMIDINE HYDROCHLORIDE 20 MCG: 100 INJECTION, SOLUTION INTRAVENOUS at 12:23

## 2018-02-21 RX ADMIN — IOPAMIDOL 15 ML: 612 INJECTION, SOLUTION INTRAVENOUS at 13:15

## 2018-02-21 RX ADMIN — LIDOCAINE HYDROCHLORIDE 60 MG: 20 INJECTION, SOLUTION INFILTRATION; PERINEURAL at 11:50

## 2018-02-21 RX ADMIN — POTASSIUM CHLORIDE, DEXTROSE MONOHYDRATE AND SODIUM CHLORIDE: 150; 5; 450 INJECTION, SOLUTION INTRAVENOUS at 22:51

## 2018-02-21 RX ADMIN — SODIUM CHLORIDE, POTASSIUM CHLORIDE, SODIUM LACTATE AND CALCIUM CHLORIDE: 600; 310; 30; 20 INJECTION, SOLUTION INTRAVENOUS at 13:32

## 2018-02-21 RX ADMIN — DEXMEDETOMIDINE HYDROCHLORIDE 20 MCG: 100 INJECTION, SOLUTION INTRAVENOUS at 12:10

## 2018-02-21 RX ADMIN — BUPIVACAINE HYDROCHLORIDE AND EPINEPHRINE BITARTRATE 30 ML: 2.5; .005 INJECTION, SOLUTION EPIDURAL; INFILTRATION; INTRACAUDAL; PERINEURAL at 13:26

## 2018-02-21 RX ADMIN — HYDROMORPHONE HYDROCHLORIDE 0.5 MG: 1 INJECTION, SOLUTION INTRAMUSCULAR; INTRAVENOUS; SUBCUTANEOUS at 16:26

## 2018-02-21 RX ADMIN — ONDANSETRON 4 MG: 2 INJECTION INTRAMUSCULAR; INTRAVENOUS at 13:22

## 2018-02-21 RX ADMIN — HYDROCODONE BITARTRATE AND ACETAMINOPHEN 1 TABLET: 5; 325 TABLET ORAL at 15:35

## 2018-02-21 RX ADMIN — ROCURONIUM BROMIDE 5 MG: 10 INJECTION INTRAVENOUS at 12:37

## 2018-02-21 RX ADMIN — FENTANYL CITRATE 50 MCG: 50 INJECTION, SOLUTION INTRAMUSCULAR; INTRAVENOUS at 14:21

## 2018-02-21 NOTE — OP NOTE
General Surgery Operative Note    PREOPERATIVE DIAGNOSIS: Biliary colic    POSTOPERATIVE DIAGNOSIS: Chronic cholecystitis with cholelithiasis, extensive intra-abdominal adhesions    PROCEDURE:  LAPAROSCOPIC CHOLECYSTECTOMY WITH CHOLANGIOGRAMS, extensive lysis of adhesions    Difficulty: Extremely difficult with a 22 modifier owing to extensive intra-abdominal adhesions near the neck of the gallbladder    ANESTHESIA:  General.    PREOPERATIVE MEDICATIONS:  Ancef IV.    SURGEON:  Jac Garrett MD    ASSISTANT:  Julienne Hinkle PA-C  A first assistant was necessary owing to challenging laparoscopic visualization and exposure.  Retraction was also necessary.    INDICATIONS: Patient presented with a history of intermittent right upper quadrant pain after eating fatty meals consistent with biliary colic.  Right upper quadrant ultrasound was reportedly a poor quality but showed a thickened gallbladder wall and a probable gallstone of the neck of the gallbladder.    PROCEDURE:  The patient was taken to the operating suite and uneventfully endotracheally intubated.  The abdomen was prepped and draped in a sterile fashion.  Surgeon initiated timeout was acknowledged.  We entered the abdomen in the left upper quadrant using Visiport technique.  3 other trocars were placed under laparoscopic visualization.  We elevated the liver and were able to identify innumerable filmy adhesions and the suggestion of a thickened dome of gallbladder.  We spent a significant amount of time lysing the adhesions around this area and taking the omentum and hepatic flexure of the colon down and away from the edge of the liver.  The gallbladder was grasped and used to elevate the liver further.  We began dissecting out some fatty adhesions down near the neck of the gallbladder.  We were unable to completely visualize a cystic artery or duct as there was such intense inflammation that I feared injuring adjacent structures.  We opened the dome of the  gallbladder, which was very thickened and blood-filled.  I elected at this point to perform a cholangiogram to delineate our anatomy.  A cholangiogram catheter was advanced through the gallbladder and clamped with a laparoscopic retractor.  I performed a cholangiogram which showed a probable stone in the cystic duct but otherwise normal anatomy with good flow of contrast into the duodenum.  We continued our dissection using combination of sharp and blunt dissection until the neck of the gallbladder was largely dissected out.  The gallbladder was taken down in a dome down fashion.  We continued our dissection up along the sides of the gallbladder, both medially and laterally, until we had created a space between the gallbladder and the liver.  I removed the 5 mm left upper quadrant trocar and replaced with a 12 mm trocar.  I then used an Endo HA stapler and fired a 30 mm blue load across the neck of the gallbladder.  Gallbladder was placed in an Endo Catch bag and removed.  The gallbladder was then brought up through the umbilical port site and removed from the abdomen.  The gallbladder fossa was reinspected, and all areas of bleeding were managed with electrocautery.  We irrigated the area with normal saline and aspirated it out.  We then removed the umbilical port trocar and closed the fascia with a figure-of-eight 0 Vicryl suture.  This was done using the Rober-Kaye device.  We then reinspected the abdomen, and everything appeared to be in pristine condition.  We removed the trocars under laparoscopic visualization and desufflated the abdomen with the Damien suction .  The skin edges were reapproximated with 4-0 Vicryl and Steri-Strips.  The patient was uneventfully extubated, awakened and taken to the PACU in stable condition.  At the conclusion of the case, all lap and needle counts were correct.      ESTIMATED BLOOD LOSS: 50 mL    INTRAOPERATIVE FINDINGS: Intense gallbladder inflammation with  evidence of chronic cholecystitis.  All bladder was removed with a Endo HA stapler just short of the cystic duct.  Cholangiogram showed normal biliary anatomy with no evidence for common duct stone.    Jac Garrett MD, MD

## 2018-02-21 NOTE — OR NURSING
Monitor showing BBB. Not showing on ECG done in Jan.2018. Dr. Sher called. 12 lead ECG ordered. Patient's only complaint is pain in left upper incision.

## 2018-02-21 NOTE — ANESTHESIA PREPROCEDURE EVALUATION
Anesthesia Evaluation     . Pt has had prior anesthetic. Type: General    No history of anesthetic complications          ROS/MED HX    ENT/Pulmonary:  - neg pulmonary ROS     Neurologic:  - neg neurologic ROS     Cardiovascular:  - neg cardiovascular ROS       METS/Exercise Tolerance:  >4 METS   Hematologic:         Musculoskeletal:         GI/Hepatic: Comment: GERD when pregnant.  Symptomatic choledocholithiasis.        Renal/Genitourinary:      (-) renal disease   Endo:         Psychiatric:         Infectious Disease:         Malignancy:         Other:                     Physical Exam  Normal systems: dental    Airway   Mallampati: II  TM distance: >3 FB  Neck ROM: full    Dental     Cardiovascular   Rhythm and rate: regular and normal      Pulmonary    breath sounds clear to auscultation                    Anesthesia Plan      History & Physical Review  History and physical reviewed and following examination; no interval change.    ASA Status:  2 .    NPO Status:  > 8 hours    Plan for General and ETT with Intravenous and Propofol induction. Maintenance will be Balanced.    PONV prophylaxis:  Ondansetron (or other 5HT-3), Dexamethasone or Solumedrol and Other (See comment) (propofol infusion)       Postoperative Care  Postoperative pain management:  IV analgesics and Oral pain medications.      Consents  Anesthetic plan, risks, benefits and alternatives discussed with:  Patient..                          .

## 2018-02-21 NOTE — PROGRESS NOTES
Surgery  Patient was being observed following challenging laparoscopic cholecystectomy.  Had some signs of a bundle branch block noted in recovery.  No associated symptoms.  After discussion with the patient's anesthesiologist, we elected to hold the patient overnight for observation and cardiac consultation.  This has been requested.  Gatito Garrett MD  General Surgery, Office [phone]

## 2018-02-21 NOTE — OR NURSING
Patient didn't sign consent form. She has questions for Dr. Garrett. Note left on the chart for Dr. Garrett.

## 2018-02-21 NOTE — DISCHARGE INSTRUCTIONS
[facility] - [facility]  Discharge Instructions: Post-Operative Laparoscopic Cholecystectomy    ACTIVITY    Expect to feel tired after your surgery.  This will gradually resolve.      Take frequent, short walks and increase your activity gradually.      Avoid strenuous physical activity or heavy lifting greater than 15-20 lbs. for 2-3 weeks.  You may climb stairs.    You may drive without restrictions when you are not using any prescription pain medication and feel comfortable in a car.    You may return to work/school when you are comfortable without any prescription pain medication.    WOUND CARE    You may remove your outer dressing or Band-Aids and shower 48 hours after the surgery.  Pat your incisions dry and leave them open to air.  Re-apply dressing (Band-Aids or gauze/tape) as needed for comfort or drainage.    You may have steri-strips (looks like white tape) on your incision.  You may peel off the steri-strips 2 weeks after your surgery if they have not peeled off on their own.     Do not soak your incisions in a tub or pool for 2 weeks.     Do not apply any lotions, creams, or ointments to your incisions.    A ridge under your incisions is normal and will gradually resolve.    DIET    Start with liquids, then gradually resume your regular diet as tolerated.  Avoid heavy, spicy, and greasy meals for 2-3 days.    Drink plenty of fluids to stay hydrated.    It is not uncommon to experience some loose stools or diarrhea after surgery.  This is your body s way of adapting to the bile which will slowly drain into your intestine.  A low fat diet may help with this.  This should improve over 1-2 months.    PAIN    Expect some tenderness and discomfort at the incision sites.  Use the prescribed pain medication at your discretion.  Expect gradual resolution of your pain over several days.    You may take ibuprofen with food (unless you have been told not to) instead of or in addition to  your prescribed pain medication.  If you are taking Norco or Percocet, do not take any additional acetaminophen/APAP/Tylenol.    Do not drink alcohol or drive while you are taking pain medications.    You may apply ice to your incisions in 20 minute intervals as needed for the next 48 hours.  After that time, consider switching to heat if you prefer.    EXPECTATIONS    Pain medications can cause constipation.  Limit use when possible.  Take over the counter stool softener/stimulant, such as Colace or Senna, 1-2 times a day with plenty of water.  You may take a mild over the counter laxative, such as Miralax or a suppository, as needed.  You may discontinue these medications once you are having regular bowel movements and/or are no longer taking your narcotic pain medication.      You may have shoulder or upper back discomfort due to the gas used in surgery.  This is temporary and should resolve in 48-72 hours.  Short, frequent walks may help with this.    FOLLOW UP    Follow up with Dr. Garrett in 2 weeks. Call [phone] to schedule an appointment or if you have any concerns. We are located at [address] [place] [address].     CALL OUR OFFICE -[phone] IF YOU HAVE:     Chills or fever above 101 F.    Increased redness, warmth, or drainage at your incisions.    Significant bleeding.    Pain not relieved by your pain medication or rest.    Increasing pain after the first 48 hours.    Any other concerns or questions.    Revised January 2018

## 2018-02-21 NOTE — IP AVS SNAPSHOT
MRN:[id number]                      After Visit Summary   2/21/2018    Ethel De Luna    MRN: [id number]           Thank you!     Thank you for choosing [facility] for your care. Our goal is always to provide you with excellent care. Hearing back from our patients is one way we can continue to improve our services. Please take a few minutes to complete the written survey that you may receive in the mail after you visit with us. Thank you!        Patient Information     Date Of Birth          1970        Designated Caregiver       Most Recent Value    Caregiver    Will someone help with your care after discharge? yes    Name of designated caregiver tatum     Phone number of caregiver [phone]    Caregiver address [address]      About your hospital stay     You were admitted on:  February 21, 2018 You last received care in the:  [facility]    You were discharged on:  February 23, 2018        Reason for your hospital stay       Heart rhythmic abnormality after gallbladder removal                  Who to Call     For medical emergencies, please call 911.  For non-urgent questions about your medical care, please call your primary care provider or clinic, [phone]  For questions related to your surgery, please call your surgery clinic        Attending Provider     Provider Specialty    Jac Garrett MD Surgery       Primary Care Provider Office Phone # Fax #    Merry Ying [phone]      After Care Instructions     Diet Instructions       Resume pre-procedure diet            Weight bearing status - As tolerated                 Follow-up Appointments     Follow-up and recommended labs and tests        See discharge instruction sheet. Follow up with your PCP in 1-2 weeks. Use incentive spirometer.                  Further instructions from your care team       [facility] [place] - [facility]  Discharge Instructions: Post-Operative Laparoscopic Cholecystectomy    ACTIVITY    Expect to feel tired after your surgery.  This will gradually resolve.      Take frequent, short walks and increase your activity gradually.      Avoid strenuous physical activity or heavy lifting greater than 15-20 lbs. for 2-3 weeks.  You may climb stairs.    You may drive without restrictions when you are not using any prescription pain medication and feel comfortable in a car.    You may return to work/school when you are comfortable without any prescription pain medication.    WOUND CARE    You may remove your outer dressing or Band-Aids and shower 48 hours after the surgery.  Pat your incisions dry and leave them open to air.  Re-apply dressing (Band-Aids or gauze/tape) as needed for comfort or drainage.    You may have steri-strips (looks like white tape) on your incision.  You may peel off the steri-strips 2 weeks after your surgery if they have not peeled off on their own.     Do not soak your incisions in a tub or pool for 2 weeks.     Do not apply any lotions, creams, or ointments to your incisions.    A ridge under your incisions is normal and will gradually resolve.    DIET    Start with liquids, then gradually resume your regular diet as tolerated.  Avoid heavy, spicy, and greasy meals for 2-3 days.    Drink plenty of fluids to stay hydrated.    It is not uncommon to experience some loose stools or diarrhea after surgery.  This is your body s way of adapting to the bile which will slowly drain into your intestine.  A low fat diet may help with this.  This should improve over 1-2 months.    PAIN    Expect some tenderness and discomfort at the incision sites.  Use the prescribed pain medication at your discretion.  Expect gradual resolution of your pain over several days.    You may take ibuprofen with food (unless you have been told not to) instead of or in addition to your prescribed pain medication.  If you  are taking Norco or Percocet, do not take any additional acetaminophen/APAP/Tylenol.    Do not drink alcohol or drive while you are taking pain medications.    You may apply ice to your incisions in 20 minute intervals as needed for the next 48 hours.  After that time, consider switching to heat if you prefer.    EXPECTATIONS    Pain medications can cause constipation.  Limit use when possible.  Take over the counter stool softener/stimulant, such as Colace or Senna, 1-2 times a day with plenty of water.  You may take a mild over the counter laxative, such as Miralax or a suppository, as needed.  You may discontinue these medications once you are having regular bowel movements and/or are no longer taking your narcotic pain medication.      You may have shoulder or upper back discomfort due to the gas used in surgery.  This is temporary and should resolve in 48-72 hours.  Short, frequent walks may help with this.    FOLLOW UP    Follow up with Dr. Garrett in 2 weeks. Call [phone] to schedule an appointment or if you have any concerns. We are located at [address] [place] [address].     CALL OUR OFFICE -[phone] IF YOU HAVE:     Chills or fever above 101 F.    Increased redness, warmth, or drainage at your incisions.    Significant bleeding.    Pain not relieved by your pain medication or rest.    Increasing pain after the first 48 hours.    Any other concerns or questions.    Revised January 2018    Pending Results     Date and Time Order Name Status Description    2/22/2018 1643 EKG 12-lead, tracing only Preliminary     2/22/2018 1414 EKG 12-lead, tracing only Preliminary             Statement of Approval     Ordered          02/23/18 1524  I have reviewed and agree with all the recommendations and orders detailed in this document.  EFFECTIVE NOW     Approved and electronically signed by:  Julienne Hinkle PA-C             Admission Information     Date & Time Provider Department  "Dept. Phone    2018 Jac Garrett MD [facility] [phone]      Your Vitals Were     Blood Pressure Pulse Temperature Respirations Height Weight    103/62 (BP Location: Left arm) 75 98.6  F (37  C) (Oral) 14 1.626 m (5' 4\") 68.4 kg (150 lb 11.2 oz)    Last Period Pulse Oximetry BMI (Body Mass Index)             2018 97% 25.87 kg/m2         [employer] Information     [employer] lets you send messages to your doctor, view your test results, renew your prescriptions, schedule appointments and more. To sign up, go to [email or web address] . Click on \"Log in\" on the left side of the screen, which will take you to the Welcome page. Then click on \"Sign up Now\" on the right side of the page.     You will be asked to enter the access code listed below, as well as some personal information. Please follow the directions to create your username and password.     Your access code is: [email or web address]  Expires: 2018  9:28 PM     Your access code will  in 90 days. If you need help or a new code, please call your [place] clinic or [phone].        Care EveryWhere ID     This is your Care EveryWhere ID. This could be used by other organizations to access your [place] medical records  [id number]        Equal Access to Services     BINDU HAYWOOD AH: Hadii ruby ortegao Soangela, waaxda luqadaha, qaybta kaalmada mariella, bonita oneal. So [facility] [phone].    ATENCIÓN: Si habla español, tiene a anna disposición servicios gratuitos de asistencia lingüística. Llame al [phone].    We comply with applicable federal civil rights laws and [place] laws. We do not discriminate on the basis of race, color, national origin, age, disability, sex, sexual orientation, or gender identity.               Review of your medicines      START taking        Dose / Directions    oxyCODONE-acetaminophen 5-325 MG per tablet   Commonly known as:  PERCOCET   Used " for:  Acute post-operative pain   Notes to Patient:  Do not exceed more than 4000mg of Tylenol within 24hrs.         Dose:  1-2 tablet   Take 1-2 tablets by mouth every 4 hours as needed for moderate to severe pain   Quantity:  30 tablet   Refills:  0       senna-docusate 8.6-50 MG per tablet   Commonly known as:  SENOKOT-S;PERICOLACE   Used for:  Acute post-operative pain        Dose:  1-2 tablet   Take 1-2 tablets by mouth 2 times daily Take while on oral narcotics to prevent or treat constipation.   Quantity:  20 tablet   Refills:  0         CONTINUE these medicines which have NOT CHANGED        Dose / Directions    ferrous sulfate 325 (65 FE) MG tablet   Commonly known as:  IRON        Take by mouth daily (with breakfast)   Refills:  0            Where to get your medicines      These medications were sent to [facility] - [address]  [address], [place] [address]     Phone:  [phone]     senna-docusate 8.6-50 MG per tablet         Some of these will need a paper prescription and others can be bought over the counter. Ask your nurse if you have questions.     Bring a paper prescription for each of these medications     oxyCODONE-acetaminophen 5-325 MG per tablet                Protect others around you: Learn how to safely use, store and throw away your medicines at www.disposemymeds.org.        Information about OPIOIDS     PRESCRIPTION OPIOIDS: WHAT YOU NEED TO KNOW    Prescription opioids can be used to help relieve moderate to severe pain and are often prescribed following a surgery or injury, or for certain health conditions. These medications can be an important part of treatment but also come with serious risks. It is important to work with your health care provider to make sure you are getting the safest, most effective care.    WHAT ARE THE RISKS AND SIDE EFFECTS OF OPIOID USE?  Prescription opioids carry serious risks of addiction and overdose,  especially with prolonged use. An opioid overdose, often marked by slowed breathing can cause sudden death. The use of prescription opioids can have a number of side effects as well, even when taken as directed:      Tolerance - meaning you might need to take more of a medication for the same pain relief    Physical dependence - meaning you have symptoms of withdrawal when a medication is stopped    Increased sensitivity to pain    Constipation    Nausea, vomiting, and dry mouth    Sleepiness and dizziness    Confusion    Depression    Low levels of testosterone that can result in lower sex drive, energy, and strength    Itching and sweating    RISKS ARE GREATER WITH:    History of drug misuse, substance use disorder, or overdose    Mental health conditions (such as depression or anxiety)    Sleep apnea    Older age (65 years or older)    Pregnancy    Avoid alcohol while taking prescription opioids.   Also, unless specifically advised by your health care provider, medications to avoid include:    Benzodiazepines (such as Xanax or Valium)    Muscle relaxants (such as Soma or Flexeril)    Hypnotics (such as Ambien or Lunesta)    Other prescription opioids    KNOW YOUR OPTIONS:  Talk to your health care provider about ways to manage your pain that do not involve prescription opioids. Some of these options may actually work better and have fewer risks and side effects:    Pain relievers such as acetaminophen, ibuprofen, and naproxen    Some medications that are also used for depression or seizures    Physical therapy and exercise    Cognitive behavioral therapy, a psychological, goal-directed approach, in which patients learn how to modify physical, behavioral, and emotional triggers of pain and stress    IF YOU ARE PRESCRIBED OPIOIDS FOR PAIN:    Never take opioids in greater amounts or more often than prescribed    Follow up with your primary health care provider and work together to create a plan on how to manage  your pain.    Talk about ways to help manage your pain that do not involve prescription opioids    Talk about all concerns and side effects    Help prevent misuse and abuse    Never sell or share prescription opioids    Never use another person's prescription opioids    Store prescription opioids in a secure place and out of reach of others (this may include visitors, children, friends, and family)    Visit www.cdc.gov/drugoverdose to learn about risks of opioid abuse and overdose    If you believe you may be struggling with addiction, tell your health care provider and ask for guidance or call [facility]'s National Helpline at 8-034-913-HELP    LEARN MORE / www.cdc.gov/drugoverdose/prescribing/guideline.html    Safely dispose of unused prescription opioids: Find your local drug take-back programs and more information about the importance of safe disposal at www.doseofreality.[place].gov             Medication List: This is a list of all your medications and when to take them. Check marks below indicate your daily home schedule. Keep this list as a reference.      Medications           Morning Afternoon Evening Bedtime As Needed    ferrous sulfate 325 (65 FE) MG tablet   Commonly known as:  IRON   Take by mouth daily (with breakfast)            8am  With breakfast                        oxyCODONE-acetaminophen 5-325 MG per tablet   Commonly known as:  PERCOCET   Take 1-2 tablets by mouth every 4 hours as needed for moderate to severe pain   Last time this was given:  2 tablets on 2/23/2018  3:16 PM   Notes to Patient:  Do not exceed more than 4000mg of Tylenol within 24hrs.                                    senna-docusate 8.6-50 MG per tablet   Commonly known as:  SENOKOT-S;PERICOLACE   Take 1-2 tablets by mouth 2 times daily Take while on oral narcotics to prevent or treat constipation.

## 2018-02-21 NOTE — IP AVS SNAPSHOT
[facility]    [address] [place], [place] [address]., [address]    [place] [address]    Phone:  [phone]                                       After Visit Summary   2/21/2018    Ethel De Luna    MRN: [id number]           After Visit Summary Signature Page     I have received my discharge instructions, and my questions have been answered. I have discussed any challenges I see with this plan with the nurse or doctor.    ..........................................................................................................................................  Patient/Patient Representative Signature      ..........................................................................................................................................  Patient Representative Print Name and Relationship to Patient    ..................................................               ................................................  Date                                            Time    ..........................................................................................................................................  Reviewed by Signature/Title    ...................................................              ..............................................  Date                                                            Time

## 2018-02-21 NOTE — ANESTHESIA POSTPROCEDURE EVALUATION
Patient: Ethel De Luna    Procedure(s):  LAPAROSCOPIC CHOLECYSTECTOMY WITH CHOLANGIOGRAMS - Wound Class: II-Clean Contaminated    Diagnosis:GALL STONE  Diagnosis Additional Information: No value filed.    Anesthesia Type:  General    Note:  Anesthesia Post Evaluation    Patient location during evaluation: PACU  Patient participation: Able to fully participate in evaluation  Level of consciousness: awake  Pain management: adequate  Airway patency: patent  Cardiovascular status: acceptable  Respiratory status: acceptable  Hydration status: acceptable  PONV: none     Anesthetic complications: None    Comments: Patient noted to have abnormal EKG in PACU.  12 lead obtained with new RBBB and some t wave inversions V2-V3 which is new from baseline EKG.  Cards consulted and Dr. Mcgraw recommended admit to obs with ECHO in AM.  Discussed findings with patient  who agreed to overnight obs.  Called report to Dr. Garrett who put in admit orders.  Ok to transfer to obs unit.         Last vitals:  Vitals:    02/21/18 1645 02/21/18 1700 02/21/18 1730   BP: 127/78 125/71 119/75   Resp: 16 16 15   Temp: 37.7  C (99.9  F) 37.4  C (99.4  F)    SpO2: 94% 93% 98%         Electronically Signed By: Ino Sher MD  February 21, 2018  5:57 PM

## 2018-02-21 NOTE — BRIEF OP NOTE
General Surgery Brief Operative Note    Pre-operative diagnosis: GALLSTONE   Post-operative diagnosis Same   Procedure: Procedure(s):  LAPAROSCOPIC CHOLECYSTECTOMY WITH CHOLANGIOGRAMS - Wound Class: II-Clean Contaminated    Surgeon(s), Assistant(s): Surgeon(s) and Role:     * Jac Garrett MD - Primary     * Julienne Hinkle PA-C - Assisting   Estimated blood loss: 50 mL   Drains: None   Specimens:   ID Type Source Tests Collected by Time Destination   A :  Tissue Gallbladder and Contents SURGICAL PATHOLOGY EXAM Jac Garrett MD 2/21/2018  1:15 PM       Findings: Acute inflammation, adhesions, IOC, staple across base of gallbladder   Condition: Stable   Comments:      Julienne Hinkle PA-C See dictated operative report for full details

## 2018-02-21 NOTE — ANESTHESIA CARE TRANSFER NOTE
Patient: Ethel De Luna    Procedure(s):  LAPAROSCOPIC CHOLECYSTECTOMY WITH CHOLANGIOGRAMS - Wound Class: II-Clean Contaminated    Diagnosis: GALL STONE  Diagnosis Additional Information: No value filed.    Anesthesia Type:   General     Note:  Airway :Face Mask  Patient transferred to:PACU  Comments: Pt to PACU. VSS. Report complete to RNHandoff Report: Identifed the Patient, Identified the Reponsible Provider, Reviewed the pertinent medical history, Discussed the surgical course, Reviewed Intra-OP anesthesia mangement and issues during anesthesia, Set expectations for post-procedure period and Allowed opportunity for questions and acknowledgement of understanding      Vitals: (Last set prior to Anesthesia Care Transfer)    CRNA VITALS  2/21/2018 1313 - 2/21/2018 1348      2/21/2018             NIBP: (!)  157/97    NIBP Mean: 128                Electronically Signed By: Salud Linn CRNA, APRN SAM  February 21, 2018  1:48 PM

## 2018-02-22 ENCOUNTER — APPOINTMENT (OUTPATIENT)
Dept: CARDIOLOGY | Facility: CLINIC | Age: 48
End: 2018-02-22
Attending: INTERNAL MEDICINE
Payer: COMMERCIAL

## 2018-02-22 LAB
ALBUMIN SERPL-MCNC: 3.2 G/DL (ref 3.4–5)
ALP SERPL-CCNC: 134 U/L (ref 40–150)
ALT SERPL W P-5'-P-CCNC: 32 U/L (ref 0–50)
ANION GAP SERPL CALCULATED.3IONS-SCNC: 5 MMOL/L (ref 3–14)
AST SERPL W P-5'-P-CCNC: 54 U/L (ref 0–45)
BILIRUB SERPL-MCNC: 0.4 MG/DL (ref 0.2–1.3)
BUN SERPL-MCNC: 7 MG/DL (ref 7–30)
CALCIUM SERPL-MCNC: 8.1 MG/DL (ref 8.5–10.1)
CHLORIDE SERPL-SCNC: 106 MMOL/L (ref 94–109)
CO2 SERPL-SCNC: 27 MMOL/L (ref 20–32)
COPATH REPORT: NORMAL
CREAT SERPL-MCNC: 0.61 MG/DL (ref 0.52–1.04)
GFR SERPL CREATININE-BSD FRML MDRD: >90 ML/MIN/1.7M2
GLUCOSE SERPL-MCNC: 102 MG/DL (ref 70–99)
HGB BLD-MCNC: 9.9 G/DL (ref 11.7–15.7)
POTASSIUM SERPL-SCNC: 4.1 MMOL/L (ref 3.4–5.3)
PROT SERPL-MCNC: 6.5 G/DL (ref 6.8–8.8)
SODIUM SERPL-SCNC: 138 MMOL/L (ref 133–144)
TROPONIN I SERPL-MCNC: <0.015 UG/L (ref 0–0.04)
TROPONIN I SERPL-MCNC: <0.015 UG/L (ref 0–0.04)

## 2018-02-22 PROCEDURE — 25000132 ZZH RX MED GY IP 250 OP 250 PS 637: Performed by: INTERNAL MEDICINE

## 2018-02-22 PROCEDURE — 84484 ASSAY OF TROPONIN QUANT: CPT | Performed by: ANESTHESIOLOGY

## 2018-02-22 PROCEDURE — 93005 ELECTROCARDIOGRAM TRACING: CPT

## 2018-02-22 PROCEDURE — 85018 HEMOGLOBIN: CPT | Performed by: SURGERY

## 2018-02-22 PROCEDURE — 25500064 ZZH RX 255 OP 636: Performed by: SURGERY

## 2018-02-22 PROCEDURE — 93010 ELECTROCARDIOGRAM REPORT: CPT | Mod: 76 | Performed by: INTERNAL MEDICINE

## 2018-02-22 PROCEDURE — 80053 COMPREHEN METABOLIC PANEL: CPT | Performed by: SURGERY

## 2018-02-22 PROCEDURE — 36415 COLL VENOUS BLD VENIPUNCTURE: CPT | Performed by: INTERNAL MEDICINE

## 2018-02-22 PROCEDURE — 93321 DOPPLER ECHO F-UP/LMTD STD: CPT | Mod: 26 | Performed by: INTERNAL MEDICINE

## 2018-02-22 PROCEDURE — 25000132 ZZH RX MED GY IP 250 OP 250 PS 637: Performed by: SURGERY

## 2018-02-22 PROCEDURE — 36415 COLL VENOUS BLD VENIPUNCTURE: CPT | Performed by: ANESTHESIOLOGY

## 2018-02-22 PROCEDURE — 25000132 ZZH RX MED GY IP 250 OP 250 PS 637: Performed by: PHYSICIAN ASSISTANT

## 2018-02-22 PROCEDURE — 84484 ASSAY OF TROPONIN QUANT: CPT | Performed by: INTERNAL MEDICINE

## 2018-02-22 PROCEDURE — 99204 OFFICE O/P NEW MOD 45 MIN: CPT | Mod: 25 | Performed by: INTERNAL MEDICINE

## 2018-02-22 PROCEDURE — 40000264 ECHO BUBBLE STUDY LIMITED WITH LUMASON

## 2018-02-22 PROCEDURE — 93325 DOPPLER ECHO COLOR FLOW MAPG: CPT | Mod: 26 | Performed by: INTERNAL MEDICINE

## 2018-02-22 PROCEDURE — 93308 TTE F-UP OR LMTD: CPT | Mod: 26 | Performed by: INTERNAL MEDICINE

## 2018-02-22 PROCEDURE — 36415 COLL VENOUS BLD VENIPUNCTURE: CPT | Performed by: SURGERY

## 2018-02-22 RX ORDER — OXYCODONE AND ACETAMINOPHEN 5; 325 MG/1; MG/1
1-2 TABLET ORAL EVERY 4 HOURS PRN
Qty: 30 TABLET | Refills: 0 | Status: SHIPPED | OUTPATIENT
Start: 2018-02-22 | End: 2019-10-14

## 2018-02-22 RX ORDER — ASPIRIN 81 MG/1
81 TABLET ORAL DAILY
Status: DISCONTINUED | OUTPATIENT
Start: 2018-02-22 | End: 2018-02-23 | Stop reason: HOSPADM

## 2018-02-22 RX ORDER — OXYCODONE AND ACETAMINOPHEN 5; 325 MG/1; MG/1
1-2 TABLET ORAL EVERY 4 HOURS PRN
Status: DISCONTINUED | OUTPATIENT
Start: 2018-02-22 | End: 2018-02-23 | Stop reason: HOSPADM

## 2018-02-22 RX ADMIN — ACETAMINOPHEN 650 MG: 325 TABLET, FILM COATED ORAL at 07:13

## 2018-02-22 RX ADMIN — OXYCODONE HYDROCHLORIDE AND ACETAMINOPHEN 2 TABLET: 5; 325 TABLET ORAL at 20:15

## 2018-02-22 RX ADMIN — METOPROLOL TARTRATE 12.5 MG: 25 TABLET ORAL at 20:15

## 2018-02-22 RX ADMIN — OXYCODONE HYDROCHLORIDE AND ACETAMINOPHEN 2 TABLET: 5; 325 TABLET ORAL at 16:07

## 2018-02-22 RX ADMIN — OXYCODONE HYDROCHLORIDE AND ACETAMINOPHEN 2 TABLET: 5; 325 TABLET ORAL at 12:09

## 2018-02-22 RX ADMIN — HYDROCODONE BITARTRATE AND ACETAMINOPHEN 1 TABLET: 5; 325 TABLET ORAL at 04:29

## 2018-02-22 RX ADMIN — ASPIRIN 81 MG: 81 TABLET, COATED ORAL at 20:15

## 2018-02-22 RX ADMIN — Medication 3 MG: at 22:20

## 2018-02-22 RX ADMIN — HYDROCODONE BITARTRATE AND ACETAMINOPHEN 2 TABLET: 5; 325 TABLET ORAL at 08:33

## 2018-02-22 RX ADMIN — SULFUR HEXAFLUORIDE 5 ML: KIT at 15:00

## 2018-02-22 ASSESSMENT — PAIN DESCRIPTION - DESCRIPTORS
DESCRIPTORS: SHARP
DESCRIPTORS: ACHING

## 2018-02-22 ASSESSMENT — ACTIVITIES OF DAILY LIVING (ADL)
DRESS: 0-->INDEPENDENT
FALL_HISTORY_WITHIN_LAST_SIX_MONTHS: NO
RETIRED_EATING: 0-->INDEPENDENT
TOILETING: 0-->INDEPENDENT
AMBULATION: 0-->INDEPENDENT
SWALLOWING: 0-->SWALLOWS FOODS/LIQUIDS WITHOUT DIFFICULTY
RETIRED_COMMUNICATION: 0-->UNDERSTANDS/COMMUNICATES WITHOUT DIFFICULTY
BATHING: 0-->INDEPENDENT
COGNITION: 0 - NO COGNITION ISSUES REPORTED
TRANSFERRING: 0-->INDEPENDENT

## 2018-02-22 NOTE — OR NURSING
O2 placed on nasal cannula at 2 liters.  When patient left undisturbed and resting sats will drop to 89 to 90

## 2018-02-22 NOTE — PLAN OF CARE
Problem: Patient Care Overview  Goal: Plan of Care/Patient Progress Review  Outcome: Improving  VSS, percocet for pain, echo pending and cards consult pending, possible d/c tonight, continue to monitor closely.

## 2018-02-22 NOTE — OR NURSING
Patient transferred to [facility] room 275.  Discharge meds given to [place] patients nurse, in the original sealed envelope that was sent from pharmacy.

## 2018-02-22 NOTE — PROGRESS NOTES
Pt received from PACU, settled, placed on cardiac, BP and capnography monitoring, sites assessed- CDI. Room orientation complete. Discharge medication bag placed into CCU medication room locked cupboard.

## 2018-02-22 NOTE — PLAN OF CARE
Problem: Patient Care Overview  Goal: Plan of Care/Patient Progress Review  Outcome: Improving  Vs recorded and stable overnight, pain medication and heat packs given for abdo pain. Pt refused capnography overnight. Up at jeff to  Bathroom, asked for fluids to be discontinued at 04.25am as she complained of needed to go to the bathroom too often. [facility] cdi, 2nd trop <0.015 . Take home meds in locked pt cabinet. To have an echo this morning. Tele overnight sr, will continue to monitor.

## 2018-02-22 NOTE — PROGRESS NOTES
"[facility]  GENERAL SURGERY Progress Note    Admission Date: 2018         Assessment and Plan:   Ethel De Luna is a 48 year old female S/P Procedure(s):  Difficult Lap gayle with IOC, found to have BBB in recovery, SR now; POD 1  - ADAT  - Change Norco to Percocet  - Cardiology consult pending, RN note for echo planned today, not ordered  - Lock IV when tolerating PO  - Ambulate 4x day and encourage IS  - Home when cleared by cardiology             Interval History:   C/o pain, Norco not helping, Oxycodone has helped in the past, tolerating clears, urinating without difficulty, ambulating.  Meds reviewed.                      Physical Exam:   Blood pressure (P) 103/64, pulse (P) 66, temperature 97.9  F (36.6  C), temperature source Oral, resp. rate (P) 16, height 5' 4\" (1.626 m), weight 149 lb 14.4 oz (68 kg), last menstrual period 2018, SpO2 (P) 100 %, not currently breastfeeding.  Temperature Temp  Av.8  F (37.1  C)  Min: 97.6  F (36.4  C)  Max: 100  F (37.8  C)   I/O last 3 completed shifts:  In: 2200 [P.O.:500; I.V.:1700]  Out: 50 [Blood:50]  Constitutional:  Awake, alert, oriented, and in no apparent distress.   Lungs: No increased work of breathing, good air exchange, clear to auscultation bilaterally, and no crackles or wheezing.   Cardiovascular: Regular rate and rhythm, normal S1 and S2, and no murmur noted.   Abdomen: Soft, non-distended, appropriately tender at incision(s), + BS.   Wound(s): Clean, dry, and intact. No erythema or drainage.    Extremities: No edema or calf tenderness. +SCDs          Data:     Recent Labs   Lab Test  18   0530  18   1830  17   0901   16   1505  16   1050   WBC   --   8.0   --    --   7.8  6.2   HGB  9.9*  10.6*  9.2*   < >  11.9  11.2*   HCT   --   33.6*   --    --   36.2  34.8*   PLT   --   303   --    --   303  263    < > = values in this interval not displayed.      Recent Labs   Lab Test  " 02/22/18   0530  01/12/18   1830  05/02/16   1050   NA  138  140   --    POTASSIUM  4.1  3.7   --    CHLORIDE  106  107   --    CO2  27  28   --    BUN  7  9   --    CR  0.61  0.67  0.60     Recent Labs   Lab Test  02/22/18   0530  01/12/18   1830   BILITOTAL  0.4  0.4   ALT  32  14   AST  54*  14   ALKPHOS  134  157*   LIPASE   --   178   Trop <0.015    Julienne Hinkle PA-C  [facility]  [phone]

## 2018-02-23 ENCOUNTER — APPOINTMENT (OUTPATIENT)
Dept: CARDIOLOGY | Facility: CLINIC | Age: 48
End: 2018-02-23
Attending: INTERNAL MEDICINE
Payer: COMMERCIAL

## 2018-02-23 VITALS
SYSTOLIC BLOOD PRESSURE: 103 MMHG | OXYGEN SATURATION: 97 % | DIASTOLIC BLOOD PRESSURE: 62 MMHG | BODY MASS INDEX: 25.73 KG/M2 | HEIGHT: 64 IN | WEIGHT: 150.7 LBS | HEART RATE: 75 BPM | TEMPERATURE: 98.6 F | RESPIRATION RATE: 14 BRPM

## 2018-02-23 LAB
CREAT SERPL-MCNC: 0.73 MG/DL (ref 0.52–1.04)
GFR SERPL CREATININE-BSD FRML MDRD: 85 ML/MIN/1.7M2
GLUCOSE BLDC GLUCOMTR-MCNC: 97 MG/DL (ref 70–99)
INTERPRETATION ECG - MUSE: NORMAL

## 2018-02-23 PROCEDURE — 25000132 ZZH RX MED GY IP 250 OP 250 PS 637: Performed by: INTERNAL MEDICINE

## 2018-02-23 PROCEDURE — 25000125 ZZHC RX 250: Performed by: INTERNAL MEDICINE

## 2018-02-23 PROCEDURE — 75574 CT ANGIO HRT W/3D IMAGE: CPT | Mod: 26 | Performed by: INTERNAL MEDICINE

## 2018-02-23 PROCEDURE — 82565 ASSAY OF CREATININE: CPT | Performed by: INTERNAL MEDICINE

## 2018-02-23 PROCEDURE — 40000141 ZZH STATISTIC PERIPHERAL IV START W/O US GUIDANCE

## 2018-02-23 PROCEDURE — 36415 COLL VENOUS BLD VENIPUNCTURE: CPT | Performed by: INTERNAL MEDICINE

## 2018-02-23 PROCEDURE — 99214 OFFICE O/P EST MOD 30 MIN: CPT | Mod: 25 | Performed by: INTERNAL MEDICINE

## 2018-02-23 PROCEDURE — 82962 GLUCOSE BLOOD TEST: CPT

## 2018-02-23 PROCEDURE — 75574 CT ANGIO HRT W/3D IMAGE: CPT

## 2018-02-23 PROCEDURE — 25000132 ZZH RX MED GY IP 250 OP 250 PS 637: Performed by: PHYSICIAN ASSISTANT

## 2018-02-23 PROCEDURE — 25000128 H RX IP 250 OP 636: Performed by: INTERNAL MEDICINE

## 2018-02-23 RX ORDER — ACYCLOVIR 200 MG/1
0-1 CAPSULE ORAL
Status: DISCONTINUED | OUTPATIENT
Start: 2018-02-23 | End: 2018-02-23

## 2018-02-23 RX ORDER — IOPAMIDOL 755 MG/ML
150 INJECTION, SOLUTION INTRAVASCULAR ONCE
Status: COMPLETED | OUTPATIENT
Start: 2018-02-23 | End: 2018-02-23

## 2018-02-23 RX ORDER — METOPROLOL TARTRATE 50 MG
50-100 TABLET ORAL
Status: DISCONTINUED | OUTPATIENT
Start: 2018-02-23 | End: 2018-02-23 | Stop reason: HOSPADM

## 2018-02-23 RX ORDER — METOPROLOL TARTRATE 1 MG/ML
5-15 INJECTION, SOLUTION INTRAVENOUS
Status: DISCONTINUED | OUTPATIENT
Start: 2018-02-23 | End: 2018-02-23

## 2018-02-23 RX ORDER — NITROGLYCERIN 0.4 MG/1
0.4 TABLET SUBLINGUAL
Status: DISCONTINUED | OUTPATIENT
Start: 2018-02-23 | End: 2018-02-23

## 2018-02-23 RX ORDER — METOPROLOL TARTRATE 50 MG
50-100 TABLET ORAL
Status: DISCONTINUED | OUTPATIENT
Start: 2018-02-23 | End: 2018-02-23

## 2018-02-23 RX ADMIN — NITROGLYCERIN 0.4 MG: 0.4 TABLET SUBLINGUAL at 10:15

## 2018-02-23 RX ADMIN — OXYCODONE HYDROCHLORIDE AND ACETAMINOPHEN 2 TABLET: 5; 325 TABLET ORAL at 11:04

## 2018-02-23 RX ADMIN — METOPROLOL TARTRATE 5 MG: 5 INJECTION, SOLUTION INTRAVENOUS at 10:21

## 2018-02-23 RX ADMIN — ASPIRIN 81 MG: 81 TABLET, COATED ORAL at 11:04

## 2018-02-23 RX ADMIN — SODIUM CHLORIDE 100 ML: 9 INJECTION, SOLUTION INTRAVENOUS at 10:28

## 2018-02-23 RX ADMIN — OXYCODONE HYDROCHLORIDE AND ACETAMINOPHEN 2 TABLET: 5; 325 TABLET ORAL at 05:14

## 2018-02-23 RX ADMIN — IOPAMIDOL 115 ML: 755 INJECTION, SOLUTION INTRAVENOUS at 10:28

## 2018-02-23 RX ADMIN — OXYCODONE HYDROCHLORIDE AND ACETAMINOPHEN 2 TABLET: 5; 325 TABLET ORAL at 15:16

## 2018-02-23 RX ADMIN — OXYCODONE HYDROCHLORIDE AND ACETAMINOPHEN 1 TABLET: 5; 325 TABLET ORAL at 01:07

## 2018-02-23 NOTE — PROGRESS NOTES
Pt was A&O x4, Percocet 2 pills given for abdominal post procedural pain x 2 with relief, VSS and on tele SR with BBB - Metoprolol 12.5 mg and ASA 81 mg given, on RA and LS clear, up independently in room and voiding Ok, pt accidentally removed iv refused new PIV insertion saying if it will be needed it can be started in AM, pt was transferred to [facility] and report was given to Petra MARTINEZ RN and her oriented RN. Plan for CT angio in AM.

## 2018-02-23 NOTE — PLAN OF CARE
Problem: Patient Care Overview  Goal: Plan of Care/Patient Progress Review  Outcome: Improving  Patient A&O. BP a little soft, other VSS on RA. Percocet for pain.Lap sites CDI. CMS intact, good pulses. Up independently. NPO since 4  AM. Tele: Sinus dysrhythmia/melonie w/ BBB. Plan for CT angiogram today.

## 2018-02-23 NOTE — PLAN OF CARE
Problem: Patient Care Overview  Goal: Plan of Care/Patient Progress Review  Outcome: Improving  VSS, incisional pain adequately controlled with prn Percocet. Pt tolerating diet, beginning to pass flatus, ambulating in room independently. Lap sites c/d/i, BS hypoactive. CT angiogram done, per cardiology no concerns. Page to Julienne THORNTON PA-C re: discharge this afternoon, awaiting return call.

## 2018-02-23 NOTE — PROGRESS NOTES
"[facility] Cardiology Progress Note      Problem List:     Patient Active Problem List   Diagnosis     AMA (advanced maternal age) multigravida 35+     S/P  section     Iron deficiency anemia, unspecified     Drug-induced malabsorption     Iron and its compounds causing adverse effect in therapeutic use(E934.0)     Need for prophylactic measure     Anemia, unspecified     S/P  section     Biliary colic     Dysrhythmia, cardiac            History:   49 yo woman with new RBBB after lap gayle. No cardiorespiratory complaints. Questionable RWMA on echocardiogram yesterday. Troponins negative.  CT cor angio done today without abnormalities             Review of Systems:   The Review of Systems is negative other than noted in the HPI.           Medications:       sodium chloride (PF)  10 mL Intravenous Once     aspirin EC  81 mg Oral Daily     metoprolol tartrate  12.5 mg Oral BID     sodium chloride (PF)  3 mL Intracatheter Q8H     melatonin  3 mg Oral At Bedtime             Physical Exam:   Most recent vitals:   Blood pressure 103/62, pulse 75, temperature 98.6  F (37  C), temperature source Oral, resp. rate 14, height 1.626 m (5' 4\"), weight 68.4 kg (150 lb 11.2 oz), last menstrual period 2018, SpO2 97 %, not currently breastfeeding.      Vital Sign Ranges  Temperature Temp  Av.2  F (36.8  C)  Min: 97.6  F (36.4  C)  Max: 98.6  F (37  C)   Blood pressure Systolic (24hrs), Av , Min:85 , Max:106        Diastolic (24hrs), Av, Min:52, Max:62      Pulse No Data Recorded   Respirations Resp  Av.5  Min: 13  Max: 24   Pulse oximetry SpO2  Av.4 %  Min: 94 %  Max: 97 %     Last 4 weights:  Wt Readings from Last 4 Encounters:   18 68.4 kg (150 lb 11.2 oz)   18 65.8 kg (145 lb)   18 65.8 kg (145 lb)   17 65.3 kg (144 lb)         I&O:    Intake/Output Summary (Last 24 hours) at 18 1523  Last data filed at 18 0800   Gross per 24 hour   Intake     "          250 ml   Output                0 ml   Net              250 ml       Telemetry:    nsr    Physical Exam:  Constitutional:   in no apparent distress and eupneic   Skin:   normal, no bruising or bleeding, no lesions and no jaundice   Head:   Normocephalic, atramatic   Eyes:   pupils equal, round and reactive to light, extra ocular muscles intact, sclera clear, conjunctiva normal   Neck:   no JVD and supple, symmetrical, trachea midline   Chest:   Normal symmetry   Lungs:   Not performed   Cardiovascular:   regualr rate   Abdomen:   not performed   Hematologic/Lymphatic:   not performed    Extremities and Back:   symmetric, no curvature, no joint swelling or tenderness, no cyanosis or clubbing and No edema   Neurological:   Awake, alert, oriented to name, place and time. No gross or focal neurologic abnormalities  Moving all extremities   Genitourinary:   not performed                Data:     Lab Results   Component Value Date    WBC 8.0 01/12/2018    HGB 9.9 (L) 02/22/2018    HCT 33.6 (L) 01/12/2018     01/12/2018    ALT 32 02/22/2018    AST 54 (H) 02/22/2018     02/22/2018    BUN 7 02/22/2018    CO2 27 02/22/2018     No results found for: CKTOTAL, CKMB  No results found for: HDL, LDL, CHOL, TRIG     EKG results:      Imaging:      Imaging:         Assessment and Plan:     47 yo woman with new RBBB on ECG post-op and questionable RWMA on echo with difficult resolution. CT cor angio benign.  May discharge. Will sign off, please call with questions.   Aspirin and metoprolol were presumtpive to cover for potential ischemia and may be discontinued.  Would have her f/u with her primary MD for ongoing risk factor modification per guidelines.   Zero calcium score on CT.       Attestation:    Total time: 30 minutes/ 25 spent in coordination of care and counselling.         Danette Holden MD 2/23/2018  3:23 PM

## 2018-02-23 NOTE — PLAN OF CARE
Problem: Patient Care Overview  Goal: Plan of Care/Patient Progress Review  Assumed care of patient from [id number]. Pt with hypo bowel sounds, controlled pain, HR regular, lungs clear, lap sites with steri strips, bandaids intact. Encouraged use of IS. Pt up independently. Report given to NOC RN.

## 2018-02-23 NOTE — CONSULTS
Consult Date:  02/22/2018      NEW PATIENT CONSULTATION      REQUESTING PHYSICIAN:  Jac Garrett MD      REASON FOR CONSULTATION:  Abnormal EKG.      HISTORY OF PRESENT ILLNESS:  Ms. De Luna is a very pleasant 48-year-old lady who was undergoing a laparoscopic cholecystectomy yesterday, which was reportedly uneventful.  She had presented with epigastric discomfort, perhaps radiating to her chest that was predominantly postprandial.  There was no exertional component to her chest discomfort.  She has no prior history of coronary artery disease, nor known significant family history of coronary artery disease.  She had no previous difficulties with exertional intolerance prior to her surgery.      Reportedly yesterday, Dr. Mcgraw was consulted with recommendations to admit the patient to observation with an echo in the morning.  The patient's echo was ultimately obtained in the afternoon and showed a questionable apical wall motion abnormality.  She had been noted to have a new right bundle branch block with right precordial T-wave inversions, new from her baseline EKG when she was postoperative.  The baseline EKG was done on 01/12/2018 for comparison and demonstrates normal sinus rhythm with a QRS duration of 88 milliseconds.  Very nonspecific ST abnormalities were seen.  Ms. De Luna had a previous transthoracic echocardiogram done in 2004 for a pericardial effusion presenting with shortness of breath and chest tightness at her age of 34.  The echocardiogram described normal systolic function without regional wall motion abnormalities and normal cardiac valves.  There was only a trivial pericardial effusion at that time.  The patient also had troponins drawn at 1:55 a.m. and then this afternoon, which were both negative.  Labs are grossly normal with a normal potassium.  AST is mildly elevated at 54.  Her EKGs were done over the course of the day today and continue to show a right bundle branch block with ST  abnormalities prominent in the right precordial leads.  Of note, the echocardiogram described normal right ventricular size and function and normal right atrial pressure.  RV systolic pressure apparently could not be obtained.  There is no suggestion of septal flattening consistent with pulmonary hypertension.      PAST MEDICAL HISTORY:  Fibroid of the cervix, infertility and anemia.      PAST SURGICAL HISTORY:  , orthopedic surgery.      FAMILY HISTORY:  Negative for premature coronary artery disease.      SOCIAL HISTORY:  Former half-pack a day smoker for 20 years.  No alcohol use.      REVIEW OF SYSTEMS:  10-point review of systems is negative other than in the HPI as outlined above.      OUTPATIENT MEDICATIONS:  None.      ALLERGIES:  NO KNOWN DRUG ALLERGIES.      PHYSICAL EXAMINATION:   VITAL SIGNS:  Blood pressure is 106/59, heart rate 75 and regular, oxygen saturation 94% on room air.   GENERAL:  This is a well-appearing lady in no apparent distress.  She is alert and oriented x 3.   HEENT:  Head and neck are unremarkable.  Pupils are equal and reactive to light and accommodation.  Extraocular motions are intact.  Mucous membranes are moist.  There is no jaundice nor icterus.   CHEST:  Clear to auscultation bilaterally without adventitious sounds.   CARDIAC:  S1, S2 are regular without appreciable murmurs, rubs or gallops.  There is no jugular venous distention.  Carotids are normal in upstroke, volume and contour bilaterally.   ABDOMEN:  Benign with the exception of postoperative tenderness.  Bowel sounds are normal.   EXTREMITIES:  Without edema.   NEUROLOGIC:  Motor and sensory grossly intact.   MUSCULOSKELETAL:  The patient moves all extremities equally.      ASSESSMENT AND PLAN:  A pleasant 48-year-old lady with a new right bundle branch block and abnormal ST segments in the right-sided leads on EKG.  This is found in combination with a possible apical wall motion abnormality on echocardiogram,  unfortunately.  The EKG changes have been consistent and occurred postoperatively yesterday.  She has no cardiorespiratory complaints at this time.      Given the wall motion abnormality on echo that is unexplained and new from prior, I think it would be reasonable to proceed with a CT coronary angiogram for further evaluation.  I had discussed the patient originally with Dr. Cope with the development of the right bundle branch block and we awaited the results of the echocardiogram.  Discussing it with him again with the results of a questionable wall motion abnormality, he did recommend that we do proceed with a CT coronary angiogram from the electrophysiological standpoint as well.      I have this arranged for tomorrow.  I have discussed at length with the patient that the goal is to exclude a coronary lesion that could represent a potential myocardial infarction.  She had expressed interest in going home and I did arrange an outpatient CT coronary angiogram, though once again, my preference would be to do it as an inpatient for the rationale described.  I did suggest to the patient it would be against medical advice to leave the hospital, but that certainly if there are factors outside her control that could not be changed, I cannot stop her.  Otherwise, we will arrange to have it done early in the morning as an inpatient and likely if it is reassuringly normal, we can let her be discharged at that point.      TOTAL TIME:  45 minutes, 40 in coordination of care and counseling.         JALIL QUINONES MD             D: 2018   T: 2018   MT: GUILLERMO      Name:     PIO HAMEED   MRN:      [id number]        Account:       [id number]   :      1970           Consult Date:  2018      Document: [id number]

## 2018-02-23 NOTE — PLAN OF CARE
Problem: Patient Care Overview  Goal: Plan of Care/Patient Progress Review  A&OX4, ABD pain well controlled with prn percocet. Vss, Teli SR with BBB. Up independently in room. Discharge instruction reviewed with pt and answered all questions. Pt expressed understanding of all discharge instructions and follow ups. Pt was discharged home with family at 1615.

## 2018-02-24 LAB
INTERPRETATION ECG - MUSE: NORMAL
INTERPRETATION ECG - MUSE: NORMAL

## 2018-02-26 ENCOUNTER — TELEPHONE (OUTPATIENT)
Dept: SURGERY | Facility: CLINIC | Age: 48
End: 2018-02-26

## 2018-02-26 DIAGNOSIS — G89.18 ACUTE POST-OPERATIVE PAIN: Primary | ICD-10-CM

## 2018-02-26 RX ORDER — OXYCODONE AND ACETAMINOPHEN 5; 325 MG/1; MG/1
1 TABLET ORAL EVERY 4 HOURS PRN
Qty: 20 TABLET | Refills: 0 | Status: SHIPPED | OUTPATIENT
Start: 2018-02-26 | End: 2019-10-14

## 2018-02-26 NOTE — TELEPHONE ENCOUNTER
Rx request for Percocet refill received. Pt called. She states she is overall feeling better. She denies fever or increased abdominal pain.  She complains of some bloating.  She could try simethicone to see if that helps.  We discussed slowly weaning off the pain medications and she states she understands our discussion.  I notified her the Percocet #20 refill will be available at the  for .    Julienne Hinkle PA-C

## 2018-04-11 ENCOUNTER — VIRTUAL VISIT (OUTPATIENT)
Dept: FAMILY MEDICINE | Facility: OTHER | Age: 48
End: 2018-04-11

## 2018-04-11 NOTE — PROGRESS NOTES
"Date:   Clinician: Juliano Diaz  Clinician NPI: [id number]  Patient: Ethel De Luna  Patient : 1970  Patient Address: [address] [place], [place] [address] [address]  Patient Phone: [phone]  Visit Protocol: UTI  Patient Summary:  Ethel is a 48 year old ( : 1970 ) female who initiated a Visit for a presumed bladder infection. When asked the question \"Please sign me up to receive news, health information and promotions from [employer].\", Ethel responded \"No\".    Her symptoms began 4 days ago and consist of urgency, hematuria, urinary incontinence, dysuria, foul smelling urine, hesitation, and urinary frequency.   Symptom Details     Urinary Frequency: Every hour     Hematuria: She has seen blood-tinged urine 4 time(s) since the onset of her symptoms. She is certain the blood is not from her vagina.      She denies loss of appetite, feeling feverish, nausea, vaginal discharge, flank pain, abdominal pain, vomiting, recent antibiotic use, and chills. Ethel has never had kidney stones. She has not been hospitalized, been a patient in a nursing home, or had a catheter in the past two weeks. She denies risk factors for sexually transmitted infections.   Ethel has had one (1) UTI in the past 12 months. Her most recent bladder infection was not within the last 4 weeks. Her current symptoms are similar to the previous UTI symptoms. She took an antibiotic for her last infection but does not remember which one.   Ethel does not get yeast infections when she takes antibiotics.   She denies pregnancy and denies breastfeeding. She has menstruated in the past month.  MEDICATIONS:    Ferrous sulfate oral  , ALLERGIES:   NKDA    Clinician Response:  Dear Ethel,  Based on the information you have provided, you likely have a bladder infection, also called acute urinary tract infection (UTI). The blood in your urine appears when the infection causes irritation of the bladder or urine carrying " structures.   To treat your infection, I am prescribing:    Nitrofurantoin monohyd/m-cryst (Macrobid) 100 mg oral capsule.. Take 1 capsule by mouth every 12 hours for 5 days. Take the medication with food. Continue taking the capsules even if you feel better before all of the medication is gone. There are no refills with this prescription.  Some women may develop a yeast infection as a side effect of taking antibiotics. If you notice symptoms of a yeast infection, [facility] can help treat that condition as well. Simply log in and complete another Visit, which will cover all of the necessary questions to determine the best treatment for you.   Some people develop allergies to antibiotics. If you notice a new rash, significant swelling, or difficulty breathing, stop the medication immediately and go into a clinic for physical evaluation.   If you become pregnant during this course of treatment, stop taking the medication and contact your primary care provider.   To help treat your current UTI and prevent future occurrences, remember to:     Drink 8-10, 8-ounce glasses of water daily.    Urinate after sexual intercourse.    Wipe front to back after using the bathroom.     You should visit a clinic for a follow-up visit if your symptoms do not improve in 1-2 days or if you experience another urinary tract infection soon after completing this treatment.   Diagnosis: Acute Uncomplicated Bladder Infection  Diagnosis ICD: N39.0  Prescription: nitrofurantoin monohyd/m-cryst (Macrobid) 100 mg oral capsule 10 capsule, 5 days supply. Take 1 capsule by mouth every 12 hours for 5 days. Refills: 0, Refill as needed: no, Allow substitutions: yes  Pharmacy: [id number] - [phone] - [address] [place] [address]

## 2018-06-13 ENCOUNTER — OFFICE VISIT (OUTPATIENT)
Dept: FAMILY MEDICINE | Facility: CLINIC | Age: 48
End: 2018-06-13
Payer: COMMERCIAL

## 2018-06-13 VITALS
WEIGHT: 155 LBS | DIASTOLIC BLOOD PRESSURE: 60 MMHG | BODY MASS INDEX: 26.46 KG/M2 | SYSTOLIC BLOOD PRESSURE: 112 MMHG | HEIGHT: 64 IN | RESPIRATION RATE: 14 BRPM | TEMPERATURE: 99.8 F | OXYGEN SATURATION: 97 % | HEART RATE: 54 BPM

## 2018-06-13 DIAGNOSIS — D50.8 OTHER IRON DEFICIENCY ANEMIA: Primary | ICD-10-CM

## 2018-06-13 DIAGNOSIS — N92.0 MENORRHAGIA WITH REGULAR CYCLE: ICD-10-CM

## 2018-06-13 DIAGNOSIS — D25.1 INTRAMURAL LEIOMYOMA OF UTERUS: ICD-10-CM

## 2018-06-13 PROBLEM — D25.9 UTERINE FIBROID: Status: ACTIVE | Noted: 2018-06-13

## 2018-06-13 LAB
BASOPHILS # BLD AUTO: 0 10E9/L (ref 0–0.2)
BASOPHILS NFR BLD AUTO: 0.3 %
DIFFERENTIAL METHOD BLD: ABNORMAL
EOSINOPHIL # BLD AUTO: 0.1 10E9/L (ref 0–0.7)
EOSINOPHIL NFR BLD AUTO: 1.9 %
ERYTHROCYTE [DISTWIDTH] IN BLOOD BY AUTOMATED COUNT: 15.4 % (ref 10–15)
HCT VFR BLD AUTO: 36.1 % (ref 35–47)
HGB BLD-MCNC: 11.5 G/DL (ref 11.7–15.7)
LYMPHOCYTES # BLD AUTO: 1.9 10E9/L (ref 0.8–5.3)
LYMPHOCYTES NFR BLD AUTO: 24.8 %
MCH RBC QN AUTO: 27.1 PG (ref 26.5–33)
MCHC RBC AUTO-ENTMCNC: 31.9 G/DL (ref 31.5–36.5)
MCV RBC AUTO: 85 FL (ref 78–100)
MONOCYTES # BLD AUTO: 0.7 10E9/L (ref 0–1.3)
MONOCYTES NFR BLD AUTO: 9.1 %
NEUTROPHILS # BLD AUTO: 4.8 10E9/L (ref 1.6–8.3)
NEUTROPHILS NFR BLD AUTO: 63.9 %
PLATELET # BLD AUTO: 288 10E9/L (ref 150–450)
RBC # BLD AUTO: 4.25 10E12/L (ref 3.8–5.2)
WBC # BLD AUTO: 7.5 10E9/L (ref 4–11)

## 2018-06-13 PROCEDURE — 83550 IRON BINDING TEST: CPT | Performed by: FAMILY MEDICINE

## 2018-06-13 PROCEDURE — 82728 ASSAY OF FERRITIN: CPT | Performed by: FAMILY MEDICINE

## 2018-06-13 PROCEDURE — 83540 ASSAY OF IRON: CPT | Performed by: FAMILY MEDICINE

## 2018-06-13 PROCEDURE — 84443 ASSAY THYROID STIM HORMONE: CPT | Performed by: FAMILY MEDICINE

## 2018-06-13 PROCEDURE — 99214 OFFICE O/P EST MOD 30 MIN: CPT | Performed by: FAMILY MEDICINE

## 2018-06-13 PROCEDURE — 85025 COMPLETE CBC W/AUTO DIFF WBC: CPT | Performed by: FAMILY MEDICINE

## 2018-06-13 PROCEDURE — 36415 COLL VENOUS BLD VENIPUNCTURE: CPT | Performed by: FAMILY MEDICINE

## 2018-06-13 NOTE — MR AVS SNAPSHOT
"              After Visit Summary   6/13/2018    Ethel De Luna    MRN: [id number]           Patient Information     Date Of Birth          1970        Visit Information        Provider Department      6/13/2018 2:30 PM Sayra Palm,  [facility]        Today's Diagnoses     Other iron deficiency anemia    -  1       Follow-ups after your visit        Follow-up notes from your care team     Return for Routine Visit.      Who to contact     If you have questions or need follow up information about today's clinic visit or your schedule please contact [facility] directly at [phone].  Normal or non-critical lab and imaging results will be communicated to you by MyChart, letter or phone within 4 business days after the clinic has received the results. If you do not hear from us within 7 days, please contact the clinic through MyChart or phone. If you have a critical or abnormal lab result, we will notify you by phone as soon as possible.  Submit refill requests through [employer] or call your pharmacy and they will forward the refill request to us. Please allow 3 business days for your refill to be completed.          Additional Information About Your Visit        Care EveryWhere ID     This is your Care EveryWhere ID. This could be used by other organizations to access your [place] medical records  [id number]        Your Vitals Were     Pulse Temperature Respirations Height Last Period Pulse Oximetry    54 99.8  F (37.7  C) (Tympanic) 14 5' 4\" (1.626 m) (LMP Unknown) 97%    Breastfeeding? BMI (Body Mass Index)                No 26.61 kg/m2           Blood Pressure from Last 3 Encounters:   06/13/18 112/60   02/23/18 103/62   01/25/18 113/70    Weight from Last 3 Encounters:   06/13/18 155 lb (70.3 kg)   02/23/18 150 lb 11.2 oz (68.4 kg)   01/25/18 145 lb (65.8 kg)              We Performed the Following     CBC with platelets and " differential     Ferritin     Iron and iron binding capacity     TSH with free T4 reflex        Primary Care Provider Office Phone # Fax #    Merry BOYER MD Ste[place] [phone]       [facility] [address]  [place] [address]        Equal Access to Services     HELEN HAYWOOD : Hadii ruby ku hadasho Soomaali, waaxda luqadaha, qaybta kaalmada adeegyada, waxay paty eid . So [facility] [phone].    ATENCIÓN: Si habla español, tiene a anna disposición servicios gratuitos de asistencia lingüística. Dana al [phone].    We comply with applicable federal civil rights laws and [place] laws. We do not discriminate on the basis of race, color, national origin, age, disability, sex, sexual orientation, or gender identity.            Thank you!     Thank you for choosing [facility]  for your care. Our goal is always to provide you with excellent care. Hearing back from our patients is one way we can continue to improve our services. Please take a few minutes to complete the written survey that you may receive in the mail after your visit with us. Thank you!             Your Updated Medication List - Protect others around you: Learn how to safely use, store and throw away your medicines at www.disposemymeds.org.          This list is accurate as of 6/13/18  3:13 PM.  Always use your most recent med list.                   Brand Name Dispense Instructions for use Diagnosis    ferrous sulfate 325 (65 Fe) MG tablet    IRON     Take by mouth daily (with breakfast)        * oxyCODONE-acetaminophen 5-325 MG per tablet    PERCOCET    30 tablet    Take 1-2 tablets by mouth every 4 hours as needed for moderate to severe pain    Acute post-operative pain       * oxyCODONE-acetaminophen 5-325 MG per tablet    PERCOCET    20 tablet    Take 1 tablet by mouth every 4 hours as needed for pain    Acute post-operative pain       senna-docusate 8.6-50  MG per tablet    SENOKOT-S;PERICOLACE    20 tablet    Take 1-2 tablets by mouth 2 times daily Take while on oral narcotics to prevent or treat constipation.    Acute post-operative pain       * Notice:  This list has 2 medication(s) that are the same as other medications prescribed for you. Read the directions carefully, and ask your doctor or other care provider to review them with you.

## 2018-06-13 NOTE — PROGRESS NOTES
"  SUBJECTIVE:   Ethel De Luna is a 48 year old female who presents to clinic today for the following health issues:        Establish care      Duration: Had complete PE and is following up for low iron    Description (location/character/radiation): Discuss cycles, weight gain    Intensity:  mild    Accompanying signs and symptoms: See above    History (similar episodes/previous evaluation): None    Precipitating or alleviating factors: None    Therapies tried and outcome: None       --Menstrual periods have been heavier in the past 1-2 years.  Cycles typically every 28 days.  Denies dark/bloody stools, easy bruising, or bloody urine.  Has uterine fibroids.  Has been on ferrous sulfate supplement (325 mg po qd) since last January 2018.    Problem list and histories reviewed & adjusted, as indicated.  Additional history: as documented    Labs reviewed in EPIC    Reviewed and updated as needed this visit by clinical staff  Tobacco  Allergies  Meds  Problems  Med Hx  Surg Hx  Fam Hx  Soc Hx        Reviewed and updated as needed this visit by Provider  Allergies  Meds  Problems         ROS:  CONSTITUTIONAL: NEGATIVE for fever, chills  INTEGUMENTARY/SKIN: NEGATIVE for worrisome rashes, moles or lesions  EYES: NEGATIVE for vision changes or irritation  ENT/MOUTH: NEGATIVE for ear, mouth and throat problems  RESP: NEGATIVE for significant cough or SOB  CV: NEGATIVE for chest pain, palpitations or peripheral edema  GI: NEGATIVE for nausea, abdominal pain, heartburn, or change in bowel habits  : NEGATIVE for frequency, dysuria, or hematuria  MUSCULOSKELETAL: NEGATIVE for significant arthralgias or myalgia  NEURO: NEGATIVE for weakness, dizziness or paresthesias  PSYCHIATRIC: NEGATIVE for changes in mood or affect    OBJECTIVE:     /60 (BP Location: Left arm, Patient Position: Sitting, Cuff Size: Adult Regular)  Pulse 54  Temp 99.8  F (37.7  C) (Tympanic)  Resp 14  Ht 5' 4\" (1.626 m)  Wt 155 lb " (70.3 kg)  LMP  (LMP Unknown)  SpO2 97%  Breastfeeding? No  BMI 26.61 kg/m2  Body mass index is 26.61 kg/(m^2).   GENERAL: alert and no distress  EYES: Eyes grossly normal to inspection, PERRL and conjunctivae and sclerae normal  HENT: ear canals and TM's normal, nose and mouth without ulcers or lesions  NECK: no adenopathy, no asymmetry, masses, or scars and thyroid normal to palpation  RESP: lungs clear to auscultation - no rales, rhonchi or wheezes  CV: regular rate and rhythm, normal S1 S2, no S3 or S4, no murmur, click or rub, no peripheral edema and peripheral pulses strong  ABDOMEN: soft, nontender, no hepatosplenomegaly, no masses and bowel sounds normal  MS: no gross musculoskeletal defects noted, no edema  SKIN: no suspicious lesions or rashes  NEURO: Normal strength and tone, mentation intact and speech normal  PSYCH: mentation appears normal, affect normal/bright    Diagnostic Test Results:  Iron studies, CBC, TSH/T4  ASSESSMENT/PLAN:     Problem List Items Addressed This Visit        SPECIALTY PROBLEM LIST    Intramural leiomyoma of uterus       Other    Iron deficiency anemia, unspecified - Primary    Relevant Orders    Iron and iron binding capacity (Completed)    Ferritin (Completed)    CBC with platelets and differential (Completed)    TSH with free T4 reflex (Completed)      Other Visit Diagnoses     Menorrhagia with regular cycle             ---Will recheck iron studies and CBC today.  Continue ferrous sulfate 325 mg po qd, may need to adjust iron supplement pending lab results.  May need to repeat pelvic U/S as well and get her uterine fibroid treated by GYN-surgery if periods continue to remain heavy (likely contributing to her iron deficiency anemia)  --Agreeable to get TSH checked as well.  --Will RTC as needed      Sayra Palm, DO  [facility]

## 2018-06-13 NOTE — Clinical Note
Please abstract the following data from this visit with this patient into the appropriate field in Epic:  Pap smear done on this date: 3/2017 (approximately), by this group: [facility], results were NORMAL.  Next Pap Smear due in 5 years with HPV co-testing

## 2018-06-14 LAB
FERRITIN SERPL-MCNC: 17 NG/ML (ref 8–252)
IRON SATN MFR SERPL: 12 % (ref 15–46)
IRON SERPL-MCNC: 47 UG/DL (ref 35–180)
TIBC SERPL-MCNC: 382 UG/DL (ref 240–430)
TSH SERPL DL<=0.005 MIU/L-ACNC: 0.51 MU/L (ref 0.4–4)

## 2018-06-17 ENCOUNTER — HEALTH MAINTENANCE LETTER (OUTPATIENT)
Age: 48
End: 2018-06-17

## 2018-06-25 ENCOUNTER — TELEPHONE (OUTPATIENT)
Dept: FAMILY MEDICINE | Facility: CLINIC | Age: 48
End: 2018-06-25

## 2018-06-25 NOTE — TELEPHONE ENCOUNTER
Reason for Call:  Other     Detailed comments: saw lab results on my chart, but has some questions    Phone Number Patient can be reached at: Cell number on file:    Telephone Information:   Mobile [phone]       Best Time: today    Can we leave a detailed message on this number? YES    Call taken on 6/25/2018 at 4:03 PM by PRO DONIS

## 2018-06-25 NOTE — TELEPHONE ENCOUNTER
Call made back to patient, she just wanted to know what TSH stood for and what her results were.

## 2018-08-06 ENCOUNTER — TELEPHONE (OUTPATIENT)
Dept: FAMILY MEDICINE | Facility: CLINIC | Age: 48
End: 2018-08-06

## 2018-08-06 NOTE — TELEPHONE ENCOUNTER
Panel Management Review      Patient has the following on her problem list: None      Composite cancer screening  Chart review shows that this patient is due/due soon for the following Pap Smear  Summary:    Patient is due/failing the following:   PAP and PHYSICAL    Action needed:   Patient needs office visit for Physical and Pap.    Type of outreach:    Sent letter.    Questions for provider review:    None                                                                                                                                    Falguni Batista LPN     Chart routed to Care Team .

## 2018-08-06 NOTE — LETTER
August 6, 2018    Ethel De Luna  [address] [place] [address]    Dear Bruna Davenport cares about your health and your health plan.  I have reviewed your medical conditions, medication list and lab results, and am making recommendations based on this review to better manage your health.    You are in particular need of attention regarding:  -Cervical Cancer Screening  -Wellness (Physical) Visit     I am recommending that you:     -schedule a WELLNESS (Physical) APPOINTMENT with me.   I will check fasting labs the same day - nothing to eat except water and meds for 8-10 hours prior. (If you go elsewhere for Wellness visits then please disregard this reminder.)    -schedule a PAP SMEAR EXAM which is due.  Please disregard this reminder if you have had this exam elsewhere within the last year.  It would be helpful for us to have a copy of your recent pap smear report in our file so that we can best coordinate your care.    If you are under/uninsured, we recommend you contact the [facility]. They offer pap smears at no charge or on a sliding fee charge. You can schedule with them at [phone]. Please have them send us the results.      Please call us at the [place] location:  [phone] or use [facility] to address the above recommendations.     Thank you for trusting [facility].  We appreciate the opportunity to serve you and look forward to supporting your healthcare in the future.    If you have (or plan to have) any of these tests done at a facility other than a [facility] or a [facility], please have the results sent to the [facility] location noted above.      Best Regards,    Sayra Palm, DO

## 2018-08-14 PROBLEM — Z98.891 S/P CESAREAN SECTION: Status: RESOLVED | Noted: 2017-03-20 | Resolved: 2018-08-14

## 2018-11-18 ENCOUNTER — HEALTH MAINTENANCE LETTER (OUTPATIENT)
Age: 48
End: 2018-11-18

## 2019-10-14 ENCOUNTER — OFFICE VISIT (OUTPATIENT)
Dept: URGENT CARE | Facility: URGENT CARE | Age: 49
End: 2019-10-14
Payer: COMMERCIAL

## 2019-10-14 VITALS
SYSTOLIC BLOOD PRESSURE: 107 MMHG | OXYGEN SATURATION: 100 % | TEMPERATURE: 98.7 F | DIASTOLIC BLOOD PRESSURE: 67 MMHG | HEART RATE: 60 BPM

## 2019-10-14 DIAGNOSIS — J01.90 ACUTE NON-RECURRENT SINUSITIS, UNSPECIFIED LOCATION: Primary | ICD-10-CM

## 2019-10-14 PROCEDURE — 99214 OFFICE O/P EST MOD 30 MIN: CPT

## 2019-10-14 RX ORDER — BENZONATATE 200 MG/1
200 CAPSULE ORAL 3 TIMES DAILY PRN
Qty: 30 CAPSULE | Refills: 0 | Status: SHIPPED | OUTPATIENT
Start: 2019-10-14

## 2019-10-15 NOTE — PROGRESS NOTES
SUBJECTIVE:  Ethel De Luna is a 49 year old female who presents with left ear pain for 2 day(s).   Severity: severe   Timing:sudden onset and worsening  Additional symptoms include congestion,  headache and dry bothersome cough      History of recurrent otitis: no    Past Medical History:   Diagnosis Date     Fibroid of cervix      Infertility     IVF     S/P  section 3/20/2017    Uncomplicated repeat C/S.  Male infant 8lbs 9oz with apgars of 8 and 9.  EBL 800cc     S/P  section 2015     Current Outpatient Medications   Medication Sig Dispense Refill     ferrous sulfate (IRON) 325 (65 FE) MG tablet Take by mouth daily (with breakfast)       oxyCODONE-acetaminophen (PERCOCET) 5-325 MG per tablet Take 1 tablet by mouth every 4 hours as needed for pain (Patient not taking: Reported on 2018) 20 tablet 0     oxyCODONE-acetaminophen (PERCOCET) 5-325 MG per tablet Take 1-2 tablets by mouth every 4 hours as needed for moderate to severe pain (Patient not taking: Reported on 2018) 30 tablet 0     senna-docusate (SENOKOT-S;PERICOLACE) 8.6-50 MG per tablet Take 1-2 tablets by mouth 2 times daily Take while on oral narcotics to prevent or treat constipation. 20 tablet 0     Social History     Tobacco Use     Smoking status: Former Smoker     Packs/day: 0.50     Years: 20.00     Pack years: 10.00     Types: Cigarettes     Smokeless tobacco: Never Used   Substance Use Topics     Alcohol use: Yes     Comment: occ       ROS:   CONSTITUTIONAL:NEGATIVE for fever, chills, change in weight    OBJECTIVE:  /67 (BP Location: Right arm, Cuff Size: Adult Regular)   Pulse 60   Temp 98.7  F (37.1  C) (Oral)   SpO2 100%   Breastfeeding? No     EXAM:  The right TM is bulging and erythematous     The right auditory canal is normal and without drainage, edema or erythema  The left TM is normal: no effusions, no erythema, and normal landmarks  The left auditory canal is normal and without drainage,  edema or erythema  Oropharynx exam is normal: no lesions, erythema, adenopathy or exudate.  GENERAL: no acute distress  EYES: EOMI,  PERRL, conjunctiva clear  NECK: supple, non-tender to palpation, no adenopathy noted  RESP: lungs clear to auscultation - no rales, rhonchi or wheezes  CV: regular rates and rhythm, normal S1 S2, no murmur noted  SKIN: no suspicious lesions or rashes     ASSESSMENT:  Acute otitis media, right  Sinus Infection   Cough, likely PND and sinus related     PLAN:  Augmentin bid x 10 d   Tessalon perls   Flonase   Netti Pot     RTC if not improving       Juliet Olsen) Nadir WOODWARD   7:34 PM  October 14, 2019

## 2020-02-16 ENCOUNTER — HEALTH MAINTENANCE LETTER (OUTPATIENT)
Age: 50
End: 2020-02-16

## 2020-11-29 ENCOUNTER — HEALTH MAINTENANCE LETTER (OUTPATIENT)
Age: 50
End: 2020-11-29

## 2021-02-14 ENCOUNTER — HEALTH MAINTENANCE LETTER (OUTPATIENT)
Age: 51
End: 2021-02-14

## 2021-04-10 ENCOUNTER — HEALTH MAINTENANCE LETTER (OUTPATIENT)
Age: 51
End: 2021-04-10

## 2021-04-28 ENCOUNTER — IMMUNIZATION (OUTPATIENT)
Dept: NURSING | Facility: CLINIC | Age: 51
End: 2021-04-28
Payer: COMMERCIAL

## 2021-04-28 PROCEDURE — 91300 PR COVID VAC PFIZER DIL RECON 30 MCG/0.3 ML IM: CPT

## 2021-04-28 PROCEDURE — 0001A PR COVID VAC PFIZER DIL RECON 30 MCG/0.3 ML IM: CPT

## 2021-05-19 ENCOUNTER — IMMUNIZATION (OUTPATIENT)
Dept: NURSING | Facility: CLINIC | Age: 51
End: 2021-05-19
Attending: INTERNAL MEDICINE
Payer: COMMERCIAL

## 2021-05-19 PROCEDURE — 91300 PR COVID VAC PFIZER DIL RECON 30 MCG/0.3 ML IM: CPT

## 2021-05-19 PROCEDURE — 0002A PR COVID VAC PFIZER DIL RECON 30 MCG/0.3 ML IM: CPT

## 2021-09-19 ENCOUNTER — HEALTH MAINTENANCE LETTER (OUTPATIENT)
Age: 51
End: 2021-09-19

## 2022-01-09 ENCOUNTER — NURSE TRIAGE (OUTPATIENT)
Dept: NURSING | Facility: CLINIC | Age: 52
End: 2022-01-09
Payer: COMMERCIAL

## 2022-01-09 NOTE — TELEPHONE ENCOUNTER
Triage Call:  Having headache, chills, and occasional cough.  Symptoms started 1/6/21 and tested positive with at home test today.      Pt was advised of protocol recommendation/disposition of home care.     Rekha Stephenson RN on 1/9/2022 at 2:49 PM        COVID 19 Nurse Triage Plan/Patient Instructions    Please be aware that novel coronavirus (COVID-19) may be circulating in the community. If you develop symptoms such as fever, cough, or SOB or if you have concerns about the presence of another infection including coronavirus (COVID-19), please contact your health care provider or visit [email or web address].     Disposition/Instructions    Home care recommended. Follow home care protocol based instructions.    Thank you for taking steps to prevent the spread of this virus.  o Limit your contact with others.  o Wear a simple mask to cover your cough.  o Wash your hands well and often.    Resources    [facility]: About COVID-19: [email or web address]    CDC: What to Do If You're Sick: www.cdc.gov/coronavirus/2019-ncov/about/steps-when-sick.html    CDC: Ending Home Isolation: www.cdc.gov/coronavirus/2019-ncov/hcp/disposition-in-home-patients.html     CDC: Caring for Someone: www.cdc.gov/coronavirus/2019-ncov/if-you-are-sick/care-for-someone.html     [facility]: Interim Guidance for Hospital Discharge to Home: www.health.[facility].[place]/diseases/coronavirus/hcp/hospdischarge.pdf    [facility] clinical trials (COVID-19 research studies): clinicalaffairs.[facility]/n-clinical-trials     Below are the COVID-19 hotlines at the [facility] ([facility]). Interpreters are available.   o For health questions: Call 876-404-1227 or 1-635.459.8410 (7 a.m. to 7 p.m.)  o For questions about schools and childcare: Call 648-694-7908 or 1-317.428.1431 (7 a.m. to 7 p.m.)                   Reason for Disposition    [1] COVID-19 diagnosed by positive lab test AND [2] mild symptoms (e.g., cough, fever, others)  AND [3] no complications or SOB    Additional Information    Negative: SEVERE difficulty breathing (e.g., struggling for each breath, speaks in single words)    Negative: Difficult to awaken or acting confused (e.g., disoriented, slurred speech)    Negative: Bluish (or gray) lips or face now    Negative: Shock suspected (e.g., cold/pale/clammy skin, too weak to stand, low BP, rapid pulse)    Negative: Sounds like a life-threatening emergency to the triager    Negative: [1] COVID-19 exposure AND [2] no symptoms    Negative: COVID-19 vaccine reaction suspected (e.g., fever, headache, muscle aches) occurring 1 to 3 days after getting vaccine    Negative: COVID-19 vaccine, questions about    Negative: [1] Lives with someone known to have influenza (flu test positive) AND [2] flu-like symptoms (e.g., cough, runny nose, sore throat, SOB; with or without fever)    Negative: [1] Adult with possible COVID-19 symptoms AND [2] triager concerned about severity of symptoms or other causes    Negative: COVID-19 and breastfeeding, questions about    Negative: SEVERE or constant chest pain or pressure (Exception: mild central chest pain, present only when coughing)    Negative: MODERATE difficulty breathing (e.g., speaks in phrases, SOB even at rest, pulse 100-120)    Negative: [1] Headache AND [2] stiff neck (can't touch chin to chest)    Negative: MILD difficulty breathing (e.g., minimal/no SOB at rest, SOB with walking, pulse <100)    Negative: Chest pain or pressure    Negative: Patient sounds very sick or weak to the triager    Negative: Fever > 103 F (39.4 C)    Negative: [1] Fever > 101 F (38.3 C) AND [2] age > 60 years    Negative: [1] Fever > 100.0 F (37.8 C) AND [2] bedridden (e.g., nursing home patient, CVA, chronic illness, recovering from surgery)    Negative: HIGH RISK for severe COVID complications (e.g., age > 64 years, obesity with BMI > 25, pregnant, chronic lung disease or other chronic medical condition)   (Exception: Already seen by PCP and no new or worsening symptoms.)    Negative: [1] HIGH RISK patient AND [2] influenza is widespread in the community AND [3] ONE OR MORE respiratory symptoms: cough, sore throat, runny or stuffy nose    Negative: [1] HIGH RISK patient AND [2] influenza exposure within the last 7 days AND [3] ONE OR MORE respiratory symptoms: cough, sore throat, runny or stuffy nose    Negative: [1] COVID-19 infection suspected by caller or triager AND [2] mild symptoms (cough, fever, or others) AND [3] negative COVID-19 rapid test    Negative: Fever present > 3 days (72 hours)    Negative: [1] Fever returns after gone for over 24 hours AND [2] symptoms worse or not improved    Negative: [1] Continuous (nonstop) coughing interferes with work or school AND [2] no improvement using cough treatment per protocol    Negative: Cough present > 3 weeks    Negative: [1] COVID-19 diagnosed by positive lab test AND [2] NO symptoms (e.g., cough, fever, others)    Protocols used: CORONAVIRUS (COVID-19) DIAGNOSED OR [id number] 8.25.2021

## 2022-01-14 ENCOUNTER — NURSE TRIAGE (OUTPATIENT)
Dept: NURSING | Facility: CLINIC | Age: 52
End: 2022-01-14
Payer: COMMERCIAL

## 2022-01-14 NOTE — TELEPHONE ENCOUNTER
Patient calling with concerns about:    Tested postive on or about 1/8/22  Had chills and bodyaches which have largely resolved.  Today she is vomiting and feels extremely fatigued  She is wondering if there is some kind of pill she can take for this or vitamins.    Patient Denies:  Fever  Difficulty breathing/shortness of breath  Dehydration symptoms    According to the protocol, patient should be able to care for this at home.  Care advice given. Patient verbalizes understanding and agrees with plan of care.     Yvette Ag RN, Nurse Advisor 2:44 PM 1/14/2022    COVID 19 Nurse Triage Plan/Patient Instructions    Please be aware that novel coronavirus (COVID-19) may be circulating in the community. If you develop symptoms such as fever, cough, or SOB or if you have concerns about the presence of another infection including coronavirus (COVID-19), please contact your health care provider or visit [email or web address].     Disposition/Instructions    Home care recommended. Follow home care protocol based instructions.    Thank you for taking steps to prevent the spread of this virus.  o Limit your contact with others.  o Wear a simple mask to cover your cough.  o Wash your hands well and often.    Resources    [facility]: About COVID-19: [email or web address]/    CDC: What to Do If You're Sick: www.cdc.gov/coronavirus/2019-ncov/about/steps-when-sick.html    CDC: Ending Home Isolation: www.cdc.gov/coronavirus/2019-ncov/hcp/disposition-in-home-patients.html     CDC: Caring for Someone: www.cdc.gov/coronavirus/2019-ncov/if-you-are-sick/care-for-someone.html     [facility]: Interim Guidance for Hospital Discharge to Home: www.health.[facility].[place]/diseases/coronavirus/hcp/hospdischarge.pdf    [facility] clinical trials (COVID-19 research studies): clinicalaffairs.[facility]/umn-clinical-trials     Below are the COVID-19 hotlines at the [place] [facility] ([facility]). Interpreters are  available.   o For health questions: Call 782-573-8451 or 1-171.531.4699 (7 a.m. to 7 p.m.)  o For questions about schools and childcare: Call 047-786-7903 or 1-815.259.3894 (7 a.m. to 7 p.m.)       Reason for Disposition    [1] COVID-19 diagnosed AND [2] has mild nausea, vomiting or diarrhea    Additional Information    Negative: SEVERE difficulty breathing (e.g., struggling for each breath, speaks in single words)    Negative: Difficult to awaken or acting confused (e.g., disoriented, slurred speech)    Negative: Bluish (or gray) lips or face now    Negative: Shock suspected (e.g., cold/pale/clammy skin, too weak to stand, low BP, rapid pulse)    Negative: Sounds like a life-threatening emergency to the triager    Negative: [1] COVID-19 exposure AND [2] no symptoms    Negative: COVID-19 vaccine reaction suspected (e.g., fever, headache, muscle aches) occurring 1 to 3 days after getting vaccine    Negative: COVID-19 vaccine, questions about    Negative: [1] Lives with someone known to have influenza (flu test positive) AND [2] flu-like symptoms (e.g., cough, runny nose, sore throat, SOB; with or without fever)    Negative: [1] Adult with possible COVID-19 symptoms AND [2] triager concerned about severity of symptoms or other causes    Negative: COVID-19 and breastfeeding, questions about    Negative: SEVERE or constant chest pain or pressure (Exception: mild central chest pain, present only when coughing)    Negative: MODERATE difficulty breathing (e.g., speaks in phrases, SOB even at rest, pulse 100-120)    Negative: [1] Headache AND [2] stiff neck (can't touch chin to chest)    Negative: MILD difficulty breathing (e.g., minimal/no SOB at rest, SOB with walking, pulse <100)    Negative: Chest pain or pressure    Negative: Patient sounds very sick or weak to the triager    Negative: Fever > 103 F (39.4 C)    Negative: [1] Fever > 101 F (38.3 C) AND [2] age > 60 years    Negative: [1] Fever > 100.0 F (37.8 C) AND  [2] bedridden (e.g., nursing home patient, CVA, chronic illness, recovering from surgery)    Negative: HIGH RISK for severe COVID complications (e.g., age > 64 years, obesity with BMI > 25, pregnant, chronic lung disease or other chronic medical condition)  (Exception: Already seen by PCP and no new or worsening symptoms.)    Negative: [1] HIGH RISK patient AND [2] influenza is widespread in the community AND [3] ONE OR MORE respiratory symptoms: cough, sore throat, runny or stuffy nose    Negative: [1] HIGH RISK patient AND [2] influenza exposure within the last 7 days AND [3] ONE OR MORE respiratory symptoms: cough, sore throat, runny or stuffy nose    Negative: [1] COVID-19 infection suspected by caller or triager AND [2] mild symptoms (cough, fever, or others) AND [3] negative COVID-19 rapid test    Negative: Fever present > 3 days (72 hours)    Negative: [1] Fever returns after gone for over 24 hours AND [2] symptoms worse or not improved    Negative: [1] Continuous (nonstop) coughing interferes with work or school AND [2] no improvement using cough treatment per protocol    Negative: Cough present > 3 weeks    Negative: [1] COVID-19 diagnosed by positive lab test AND [2] NO symptoms (e.g., cough, fever, others)    Negative: [1] COVID-19 diagnosed by positive lab test AND [2] mild symptoms (e.g., cough, fever, others) AND [3] no complications or SOB    Negative: [1] COVID-19 diagnosed by doctor (or NP/PA) AND [2] mild symptoms (e.g., cough, fever, others) AND [3] no complications or SOB    Protocols used: CORONAVIRUS (COVID-19) DIAGNOSED OR [id number] 8.25.2021

## 2022-03-06 ENCOUNTER — HEALTH MAINTENANCE LETTER (OUTPATIENT)
Age: 52
End: 2022-03-06

## 2022-05-01 ENCOUNTER — HEALTH MAINTENANCE LETTER (OUTPATIENT)
Age: 52
End: 2022-05-01

## 2022-11-21 ENCOUNTER — HEALTH MAINTENANCE LETTER (OUTPATIENT)
Age: 52
End: 2022-11-21

## 2022-12-09 ENCOUNTER — OFFICE VISIT (OUTPATIENT)
Dept: URGENT CARE | Facility: URGENT CARE | Age: 52
End: 2022-12-09
Payer: COMMERCIAL

## 2022-12-09 VITALS
OXYGEN SATURATION: 97 % | SYSTOLIC BLOOD PRESSURE: 103 MMHG | DIASTOLIC BLOOD PRESSURE: 70 MMHG | HEART RATE: 74 BPM | TEMPERATURE: 99.1 F

## 2022-12-09 DIAGNOSIS — J32.1 FRONTAL SINUSITIS, UNSPECIFIED CHRONICITY: Primary | ICD-10-CM

## 2022-12-09 PROCEDURE — 99203 OFFICE O/P NEW LOW 30 MIN: CPT

## 2022-12-09 NOTE — PROGRESS NOTES
SUBJECTIVE:   Ethel De Luna is a 52 year old female who complains of nasal congestion, yellow nasal discharge, headache, facial pressure and productive cough for 14 days. She denies a history of chills, wheezing and shortness of breath and denies a history of asthma. Patient denies smoke cigarettes.     OBJECTIVE:    /70   Pulse 74   Temp 99.1  F (37.3  C) (Tympanic)   SpO2 97%     She appears well, vital signs are as noted by the nurse. Ears normal.  Throat and pharynx YELLOW pnd.  Neck supple. No adenopathy in the neck. Nose is congested. Sinuses non tender. The chest is clear, without wheezes or rales. CVS exam: S1, S2 normal, no murmur, click, rub or gallop, regular rate and rhythm.     ASSESSMENT:    Diagnosis Comments   1. Frontal sinusitis, unspecified chronicity  amoxicillin-clavulanate (AUGMENTIN) 875-125 MG tablet             PLAN:  START FLONASE ALSO  Symptomatic therapy suggested: push fluids and rest. Call or return to clinic prn if these symptoms worsen or fail to improve as anticipated.

## 2023-04-10 ENCOUNTER — ANCILLARY ORDERS (OUTPATIENT)
Dept: ULTRASOUND IMAGING | Facility: CLINIC | Age: 53
End: 2023-04-10

## 2023-04-10 DIAGNOSIS — R79.89 ELEVATED LIVER FUNCTION TESTS: ICD-10-CM

## 2023-05-09 ENCOUNTER — ANCILLARY PROCEDURE (OUTPATIENT)
Dept: ULTRASOUND IMAGING | Facility: CLINIC | Age: 53
End: 2023-05-09
Attending: FAMILY MEDICINE
Payer: COMMERCIAL

## 2023-05-09 DIAGNOSIS — R79.89 ELEVATED LIVER FUNCTION TESTS: ICD-10-CM

## 2023-05-09 PROCEDURE — 76705 ECHO EXAM OF ABDOMEN: CPT

## 2023-06-02 ENCOUNTER — HEALTH MAINTENANCE LETTER (OUTPATIENT)
Age: 53
End: 2023-06-02

## 2024-06-23 ENCOUNTER — HEALTH MAINTENANCE LETTER (OUTPATIENT)
Age: 54
End: 2024-06-23

## 2025-07-12 ENCOUNTER — HEALTH MAINTENANCE LETTER (OUTPATIENT)
Age: 55
End: 2025-07-12

## (undated) DEVICE — GLOVE PROTEXIS BLUE W/NEU-THERA 7.5  2D73EB75

## (undated) DEVICE — SU VICRYL 0 UR-6 27" J603H

## (undated) DEVICE — NDL ANGIOCATH 14GA 1.25" 4048

## (undated) DEVICE — CLIP APPLIER ENDO 05MM MED/LG 176630

## (undated) DEVICE — CATH CHOLANGIOGRAM 4.5FR TAUT METAL TIP 20018-M55

## (undated) DEVICE — PREP CHLORAPREP 26ML TINTED ORANGE  260815

## (undated) DEVICE — ENDO POUCH REIACATCH 2.44" 10MM CATCH10

## (undated) DEVICE — SU VICRYL 4-0 PS-2 18" UND J496H

## (undated) DEVICE — ESU HOLDER LAP INST DISP PURPLE LONG 330MM H-PRO-330

## (undated) DEVICE — PACK LAP CHOLE SLC15LCFSD

## (undated) DEVICE — DEVICE SUTURE GRASPER TROCAR CLOSURE 14GA PMITCSG

## (undated) DEVICE — STPL ENDO RELOAD GIA 30 X 3.5MM ROTIC 030452

## (undated) DEVICE — ENDO TROCAR OPTICAL 11MM VERSAPORT PLUS W/FIX CAN ONB11STF

## (undated) DEVICE — ENDO TROCAR FIRST ENTRY KII FIOS Z-THRD 05X100MM CTF03

## (undated) DEVICE — SUCTION IRR STRYKERFLOW II W/TIP 250-070-520

## (undated) DEVICE — LINEN TOWEL PACK X5 5464

## (undated) DEVICE — SUCTION CANISTER MEDIVAC LINER 3000ML W/LID 65651-530

## (undated) DEVICE — SYR 30ML LL W/O NDL 302832

## (undated) DEVICE — RAD RX ISOVUE 300 (50ML) 61% IOPAMIDOL CHARGE PER ML

## (undated) DEVICE — ENDO TROCAR OPTICAL 12MM VERSAPORT PLUS W/FIX CAN ONB12STF

## (undated) DEVICE — ENDO TROCAR OPTICAL 05MM VERSAPORT PLUS W/FIX CAN ONB5STF

## (undated) DEVICE — ENDO CANNULA 05MM VERSAONE UNIVERSAL UNVCA5STF

## (undated) DEVICE — GLOVE PROTEXIS W/NEU-THERA 7.5  2D73TE75

## (undated) DEVICE — DRAPE C-ARM 60X42" 1013

## (undated) DEVICE — TUBING IV EXTENSION SET ANESTHESIA 34" MLL 2C6227

## (undated) DEVICE — STPL ENDO HANDLE GIA ULTRA UNIVERSAL STD EGIAUSTND

## (undated) DEVICE — SOL NACL 0.9% INJ 1000ML BAG 2B1324X

## (undated) DEVICE — SU VICRYL 3-0 SH 27" J316H

## (undated) DEVICE — CONNECTOR STOPCOCK 3 WAY MALE LL HI-FLO MX9311L

## (undated) RX ORDER — CEFAZOLIN SODIUM 2 G/100ML
INJECTION, SOLUTION INTRAVENOUS
Status: DISPENSED
Start: 2018-02-21

## (undated) RX ORDER — HYDROMORPHONE HYDROCHLORIDE 1 MG/ML
INJECTION, SOLUTION INTRAMUSCULAR; INTRAVENOUS; SUBCUTANEOUS
Status: DISPENSED
Start: 2018-02-21

## (undated) RX ORDER — PROPOFOL 10 MG/ML
INJECTION, EMULSION INTRAVENOUS
Status: DISPENSED
Start: 2018-02-21

## (undated) RX ORDER — EPINEPHRINE 1 MG/ML
INJECTION, SOLUTION INTRAMUSCULAR; SUBCUTANEOUS
Status: DISPENSED
Start: 2018-02-21

## (undated) RX ORDER — HYDROCODONE BITARTRATE AND ACETAMINOPHEN 5; 325 MG/1; MG/1
TABLET ORAL
Status: DISPENSED
Start: 2018-02-21

## (undated) RX ORDER — METOPROLOL TARTRATE 1 MG/ML
INJECTION, SOLUTION INTRAVENOUS
Status: DISPENSED
Start: 2018-02-23

## (undated) RX ORDER — DEXAMETHASONE SODIUM PHOSPHATE 4 MG/ML
INJECTION, SOLUTION INTRA-ARTICULAR; INTRALESIONAL; INTRAMUSCULAR; INTRAVENOUS; SOFT TISSUE
Status: DISPENSED
Start: 2018-02-21

## (undated) RX ORDER — ONDANSETRON 2 MG/ML
INJECTION INTRAMUSCULAR; INTRAVENOUS
Status: DISPENSED
Start: 2018-02-21

## (undated) RX ORDER — FENTANYL CITRATE 50 UG/ML
INJECTION, SOLUTION INTRAMUSCULAR; INTRAVENOUS
Status: DISPENSED
Start: 2018-02-21

## (undated) RX ORDER — NITROGLYCERIN 0.4 MG/1
TABLET SUBLINGUAL
Status: DISPENSED
Start: 2018-02-23

## (undated) RX ORDER — GLYCOPYRROLATE 0.2 MG/ML
INJECTION, SOLUTION INTRAMUSCULAR; INTRAVENOUS
Status: DISPENSED
Start: 2018-02-21

## (undated) RX ORDER — BUPIVACAINE HYDROCHLORIDE 2.5 MG/ML
INJECTION, SOLUTION EPIDURAL; INFILTRATION; INTRACAUDAL
Status: DISPENSED
Start: 2018-02-21

## (undated) RX ORDER — LIDOCAINE HYDROCHLORIDE 20 MG/ML
INJECTION, SOLUTION EPIDURAL; INFILTRATION; INTRACAUDAL; PERINEURAL
Status: DISPENSED
Start: 2018-02-21